# Patient Record
Sex: MALE | Race: WHITE | NOT HISPANIC OR LATINO | Employment: OTHER | URBAN - METROPOLITAN AREA
[De-identification: names, ages, dates, MRNs, and addresses within clinical notes are randomized per-mention and may not be internally consistent; named-entity substitution may affect disease eponyms.]

---

## 2017-01-24 ENCOUNTER — TELEPHONE (OUTPATIENT)
Dept: NEUROLOGY | Facility: CLINIC | Age: 72
End: 2017-01-24

## 2017-01-24 NOTE — TELEPHONE ENCOUNTER
I spoke to Ted---doing well, and no seizures     He is now going onto Medicare part D.  The cost of brand name Dilantin was discussed.  He does not want to switch to generic.  I believe he has had seizures on the generic version in the past.  It would be a good idea for him to remain on Dilantin brand name permanently.  He currently takes 330 mg per day.  We reviewed that the 100 mg capsules and the 300 mg capsules are both extended release.

## 2017-07-24 ENCOUNTER — TELEPHONE (OUTPATIENT)
Dept: NEUROLOGY | Facility: CLINIC | Age: 72
End: 2017-07-24

## 2017-07-25 NOTE — TELEPHONE ENCOUNTER
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  He remains seizure-free for many years on Dilantin.  He text at me his recent lab from June 27 of this year.  Dilantin level XVII.6.  Free level I.3.  He also noted mild chronic imbalance.  He thought this could be due to Dilantin and I agreed.  I said it was a problem with Dilantin toxicity but also can be a side effect of long-term Dilantin use.  There was no specific antidote for this problem other than to keep the dose in the nontoxic range.  Another possibility of course would be to switch medicines.  I recommended possibly trying Keppra with a slight reduction of Dilantin by 30 mg per day.  The Keppra would need a blood level as well.  No decisions made.    I will also notify him that imbalance can be due to thyroid or vitamin deficiencies and suggest B12 folate and TSH levels

## 2017-09-12 ENCOUNTER — TELEPHONE (OUTPATIENT)
Dept: NEUROLOGY | Facility: CLINIC | Age: 72
End: 2017-09-12

## 2017-09-12 NOTE — TELEPHONE ENCOUNTER
----- Message from Shelia Marcano sent at 9/12/2017  4:20 PM EDT -----  Contact: [phone]   want to speak to  on coming in for an appointment because he is having Gait issues, before he is put on any medication or switch he want to physically come in and speak to .

## 2017-09-13 NOTE — TELEPHONE ENCOUNTER
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      He will come September 22 at 1 PM.      Could you at him to the schedule please?

## 2017-09-22 ENCOUNTER — OFFICE VISIT (OUTPATIENT)
Dept: NEUROLOGY | Facility: CLINIC | Age: 72
End: 2017-09-22

## 2017-09-22 VITALS
SYSTOLIC BLOOD PRESSURE: 116 MMHG | DIASTOLIC BLOOD PRESSURE: 72 MMHG | OXYGEN SATURATION: 98 % | WEIGHT: 172.2 LBS | HEART RATE: 67 BPM | BODY MASS INDEX: 24.65 KG/M2 | HEIGHT: 70 IN

## 2017-09-22 DIAGNOSIS — G40.009 PARTIAL IDIOPATHIC EPILEPSY WITH SEIZURES OF LOCALIZED ONSET, NOT INTRACTABLE, WITHOUT STATUS EPILEPTICUS (HCC): Primary | ICD-10-CM

## 2017-09-22 DIAGNOSIS — R26.89 LOSS OF BALANCE: ICD-10-CM

## 2017-09-22 DIAGNOSIS — T50.905D ADVERSE DRUG EFFECT, SUBSEQUENT ENCOUNTER: ICD-10-CM

## 2017-09-22 PROBLEM — T50.905A ADVERSE DRUG EFFECT: Status: ACTIVE | Noted: 2017-09-22

## 2017-09-22 PROCEDURE — 99203 OFFICE O/P NEW LOW 30 MIN: CPT | Performed by: PSYCHIATRY & NEUROLOGY

## 2017-09-22 RX ORDER — RAMIPRIL 5 MG/1
5 CAPSULE ORAL 2 TIMES DAILY
COMMUNITY
End: 2018-01-17

## 2017-09-22 RX ORDER — HYDROCHLOROTHIAZIDE 25 MG/1
25 TABLET ORAL DAILY
COMMUNITY

## 2017-09-22 RX ORDER — PHENYTOIN SODIUM 100 MG/1
300 CAPSULE, EXTENDED RELEASE ORAL 2 TIMES DAILY
COMMUNITY

## 2017-09-22 RX ORDER — ASPIRIN/CALCIUM/MAG/ALUMINUM 325 MG
TABLET ORAL
COMMUNITY

## 2017-09-22 RX ORDER — ROSUVASTATIN CALCIUM 20 MG/1
TABLET, COATED ORAL
Refills: 0 | COMMUNITY
Start: 2017-09-18

## 2017-09-22 NOTE — PROGRESS NOTES
Subjective:     Patient ID: Ted Morales is a 72 y.o. male.    History of Present Illness  The following portions of the patient's history were reviewed and updated as appropriate: allergies, current medications, past family history, past medical history, past social history, past surgical history and problem list.  Epilepsy: This physician has no me for many years.  I signed at least 25 years ago for history of a well-controlled seizure disorder while taking Dilantin and doing well.  Over the years I have discussed his blood levels but have not needed to see him annually.  More than 13 years ago he had a seizure while in [place].  That time his Dilantin level had fallen to less than 10 and since that time he has been seizure free and the Dilantin dose has been about 330 mg daily.  Dilantin levels have generally been between 10 and 20.  On June 27 the Dilantin level was 17.6.  The free level was 1.3    He has had auras prior to his seizures.  These have been of a visual nature.  He has a sensation of looking at a mere and the image getting deeper and deeper into the mayor.  One time he had such an aura which did not become a seizure.  He is probably had less than 10 seizures in his whole life    He has noted symptoms of imbalance slowly over the last couple of years.  There've been no falls.  Sometimes while at communion he will noted a tendency to be off balance.  No other symptoms of Dilantin related problems such as neuropathy noted.  He has not had any recent brain scan studies.    Recent TSH was normal.  B12 and folate have not been checked.    He has a past history of bacterial pulmonary infection which has resolved.  Review of Systems   Constitutional: Negative for activity change, appetite change and fatigue.   HENT: Negative for ear pain, facial swelling and trouble swallowing.    Eyes: Negative for photophobia, pain and visual disturbance.   Respiratory: Negative for choking, chest tightness and  shortness of breath.    Cardiovascular: Negative for chest pain, palpitations and leg swelling.   Gastrointestinal: Negative for abdominal pain, constipation and nausea.   Endocrine: Negative for polydipsia, polyphagia and polyuria.   Genitourinary: Negative for difficulty urinating, frequency and urgency.   Musculoskeletal: Positive for gait problem. Negative for back pain and neck pain.   Skin: Negative for color change, rash and wound.   Allergic/Immunologic: Negative for environmental allergies, food allergies and immunocompromised state.   Neurological: Negative for dizziness, tremors, seizures, syncope, facial asymmetry, speech difficulty, weakness, light-headedness, numbness and headaches.   Hematological: Negative for adenopathy. Bruises/bleeds easily.   Psychiatric/Behavioral: Negative for agitation, behavioral problems, confusion, decreased concentration, dysphoric mood, hallucinations, self-injury, sleep disturbance and suicidal ideas. The patient is not nervous/anxious and is not hyperactive.         Objective:    Neurologic Exam     Mental Status   Oriented to person, place, and time.   Follows 3 step commands.   Attention: normal. Concentration: normal.   Speech: speech is normal   Level of consciousness: alert  Knowledge: good.   Able to name object. Able to read. Able to repeat. Able to write. Normal comprehension.     Cranial Nerves     CN II   Visual fields full to confrontation.     CN III, IV, VI   Pupils are equal, round, and reactive to light.    CN VII   Facial expression full, symmetric.     CN VIII   CN VIII normal.     CN XI   CN XI normal.     CN XII   CN XII normal.     Motor Exam   Muscle bulk: normal  Overall muscle tone: normal       All strength.  No atrophy     Sensory Exam   Light touch normal.   Vibration normal.   Proprioception normal.        Vibration slightly decreased at the toes but normal elsewhere throughout the foot     Gait, Coordination, and Reflexes     Gait  Gait:  normal    Coordination   Romberg: negative  Finger to nose coordination: normal  Heel to shin coordination: normal  Tandem walking coordination: normal    Tremor   Resting tremor: absent  Intention tremor: absent  Action tremor: absent    Reflexes   Right brachioradialis: 1+  Left brachioradialis: 1+  Right biceps: 1+  Left biceps: 1+  Right triceps: 1+  Left triceps: 1+  Right patellar: 1+  Left patellar: 1+  Right achilles: 1+  Left achilles: 1+  Right plantar: normal  Left plantar: normal      Physical Exam   Constitutional: He is oriented to person, place, and time. He appears well-developed and well-nourished.   Eyes: Pupils are equal, round, and reactive to light.   Neck: Normal range of motion. Neck supple.   No carotid or vertebral bruits   Neurological: He is oriented to person, place, and time. He has a normal Finger-Nose-Finger Test, a normal Heel to Shin Test, a normal Romberg Test and a normal Tandem Gait Test. Gait normal.   Reflex Scores:       Tricep reflexes are 1+ on the right side and 1+ on the left side.       Bicep reflexes are 1+ on the right side and 1+ on the left side.       Brachioradialis reflexes are 1+ on the right side and 1+ on the left side.       Patellar reflexes are 1+ on the right side and 1+ on the left side.       Achilles reflexes are 1+ on the right side and 1+ on the left side.  Psychiatric: He has a normal mood and affect. His speech is normal and behavior is normal. Judgment and thought content normal.   Nursing note and vitals reviewed.      Assessment/Plan:       Problems Addressed this Visit        Unprioritized    Partial idiopathic epilepsy with seizures of localized onset, not intractable, without status epilepticus - Primary    Relevant Medications    phenytoin (DILANTIN) 100 MG ER capsule    phenytoin (DILANTIN) 30 MG ER capsule    Adverse drug effect    Loss of balance    Relevant Orders    Ambulatory Referral to Physical Therapy Evaluate and treat           I  suspect he has partial seizures with rapid secondary generalization, well-controlled on Dilantin.  The imbalance likely is due to Dilantin cerebellar fax.  I did not see any definite ataxia today in spite of his symptoms.  His tandem walk was normal.    He had no evidence of peripheral neuropathy.    We discussed the following issues: Consider vestibular rehabilitation.  Consider brain MRI with contrast, B12 folate and MMA levels.  Consider a trial of BuSpar.  Consider adding Keppra and then tapering Dilantin.    At this time he is going to go ahead with vestibular rehabilitation.  He is going to have the Dilantin level checked at six-month intervals.  Free level should be checked with each blood test.

## 2017-10-04 ENCOUNTER — HOSPITAL ENCOUNTER (OUTPATIENT)
Dept: MRI IMAGING | Facility: HOSPITAL | Age: 72
Discharge: HOME | End: 2017-10-04
Attending: PSYCHIATRY & NEUROLOGY | Admitting: PSYCHIATRY & NEUROLOGY

## 2017-10-04 DIAGNOSIS — G40.009 PARTIAL IDIOPATHIC EPILEPSY WITH SEIZURES OF LOCALIZED ONSET, NOT INTRACTABLE, WITHOUT STATUS EPILEPTICUS (HCC): ICD-10-CM

## 2017-10-04 DIAGNOSIS — R26.89 LOSS OF BALANCE: ICD-10-CM

## 2017-10-04 PROCEDURE — 70551 MRI BRAIN STEM W/O DYE: CPT

## 2017-10-05 ENCOUNTER — APPOINTMENT (OUTPATIENT)
Dept: MRI IMAGING | Facility: HOSPITAL | Age: 72
End: 2017-10-05
Attending: PSYCHIATRY & NEUROLOGY

## 2017-10-25 ENCOUNTER — TREATMENT (OUTPATIENT)
Dept: PHYSICAL THERAPY | Facility: CLINIC | Age: 72
End: 2017-10-25

## 2017-10-25 DIAGNOSIS — R26.89 LOSS OF BALANCE: Primary | ICD-10-CM

## 2017-10-25 DIAGNOSIS — R26.2 DIFFICULTY WALKING: ICD-10-CM

## 2017-10-25 PROCEDURE — 97112 NEUROMUSCULAR REEDUCATION: CPT | Performed by: PHYSICAL THERAPIST

## 2017-10-25 PROCEDURE — G8978 MOBILITY CURRENT STATUS: HCPCS | Performed by: PHYSICAL THERAPIST

## 2017-10-25 PROCEDURE — G8979 MOBILITY GOAL STATUS: HCPCS | Performed by: PHYSICAL THERAPIST

## 2017-10-25 PROCEDURE — 97161 PT EVAL LOW COMPLEX 20 MIN: CPT | Performed by: PHYSICAL THERAPIST

## 2017-10-25 NOTE — PROGRESS NOTES
Physical Therapy Initial Evaluation-  Vestibular Therapy      Patient Name: Ted Morales       Patient MRN: [id number]  : 1945  Physician:Param Reese MD  Date: 10/25/2017    Encounter Diagnoses   Name Primary?   • Loss of balance Yes   • Difficulty walking        Objective Testing:  Positional Testing  Positional Testing: With infrared goggles  Vertebrobasilar Artery Screen - Right: Negative  Vertebrobasilar Artery Screen - Left: Negative  Ember-Hallpike Right: No nystagmus  [place]-Hallpike Left: No nystagmus  Horizontal Roll Test Right: No nystagmus  Horizontal Roll Test Left: No nystagmus  Computerized Dynamic Posturography  Composite Equilibrium Score: 80, above normal for age  Sensory Analysis WDL: Within Defined Limits  Center of Gravity Alignment: anterior  Strategy Analysis: Ankle Dominant  Occulomotor Exam Fixation Present  Spontaneous Nystagmus: Absent  Smooth Pursuit: Saccadic (vertical), normal horizontal  Saccades: Intact, Bilateral:  Convergence: Normal  VOR with Occulomotor Exam Fixation Absent   Spontaneous Nystagmus: Absent    THERAPY ASSESSMENT: Patient is a 72 y.o. male. Patient presents to physical therapy due to complaints of loss of balance and difficulty walking. He tested normal on use of all sensory systems of balance.  MRI of brain was unremarkable. Oculomotor testing and positional vertigo testing was negative. Patient wishes to participate in PT to improve walking ability.  Patient is a good candidate for physical therapy to address the following:  Functional Limitations: Walking   Length of Therapy: 2 weeks   PT Frequency: PT 2x week   PT Interventions: Home Exercise Program, Balance Training   Patient Agrees with Plan of Care: Yes    REHAB POTENTIAL: good            SHORT TERM GOALS: To be met in 2 weeks:  1. Patient is independent with HEP of static/dynamic and ADL incorporated challenges to VSR/VOR.  2. Patient will report at least 30% improvement in overall condition.        LONG TERM GOALS:To be met in 4 weeks:  1. Patient will report no loss of balance with ADLs to demonstrate improved functional balance.    NMR 50278 10 minutes Neurocom testing    Timed Code Treatment: 10   Minutes     Total Treatment Time: 60      Minutes      PT SIGNATURE: Rossana Stafford PT   DATE TREATMENT INITIATED: 10/26/2017    Medicare Initial Certification  Certification Period: 1/24/2018  I certify that the therapy services are furnished while this patient is under my care.  The services outlined above are required by this patient, and will be reviewed every 90 days.     PHYSICIAN: Param Reese MD      DATE:     Please sign and return via fax to [phone].. Thank you, [facility].

## 2017-11-14 ENCOUNTER — TREATMENT (OUTPATIENT)
Dept: PHYSICAL THERAPY | Facility: CLINIC | Age: 72
End: 2017-11-14

## 2017-11-14 DIAGNOSIS — R26.89 LOSS OF BALANCE: Primary | ICD-10-CM

## 2017-11-14 DIAGNOSIS — R26.2 DIFFICULTY WALKING: ICD-10-CM

## 2017-11-14 PROCEDURE — 97112 NEUROMUSCULAR REEDUCATION: CPT | Performed by: PHYSICAL THERAPIST

## 2017-11-14 NOTE — PROGRESS NOTES
Physical Therapy Daily Progress Note    Visit #: 2  Ted Morales reports: I fell walking up the stairs while I was carrying boxes.   Pain Scale (0-10):     Subjective       Objective   See Exercise, Manual, and Modality Logs for complete treatment.     PROCEDURES AND MODALITIES:  Paraffin:   pre-rx  Moist Heat:    Ice:   post-rx  E-Stim:    Ultrasound:    Ionto:   Traction:    Dry Needling:         NMR 70227 45 minutes     Timed Code Treatment: 45 Minutes  Total Treatment Time: 45 Minutes    Assessment/Plan  Patient has reduced SLS times L > R. Patient has reduced balance on compliant surfaces dominguez with head movement and vision denied.     Progress per Plan of Care      Rossana Stafford, PT, DPT  Physical Therapist  [place] License # [id number]

## 2017-12-15 ENCOUNTER — LAB (OUTPATIENT)
Dept: LAB | Facility: HOSPITAL | Age: 72
End: 2017-12-15

## 2017-12-15 ENCOUNTER — CONSULT (OUTPATIENT)
Dept: ONCOLOGY | Facility: CLINIC | Age: 72
End: 2017-12-15

## 2017-12-15 VITALS
BODY MASS INDEX: 25.68 KG/M2 | DIASTOLIC BLOOD PRESSURE: 76 MMHG | OXYGEN SATURATION: 100 % | TEMPERATURE: 97.7 F | WEIGHT: 173.4 LBS | HEIGHT: 69 IN | SYSTOLIC BLOOD PRESSURE: 126 MMHG | RESPIRATION RATE: 12 BRPM | HEART RATE: 51 BPM

## 2017-12-15 DIAGNOSIS — D47.2 MGUS (MONOCLONAL GAMMOPATHY OF UNKNOWN SIGNIFICANCE): Primary | ICD-10-CM

## 2017-12-15 LAB
ALBUMIN SERPL-MCNC: 4.4 G/DL (ref 3.5–5.2)
ALBUMIN/GLOB SERPL: 1.4 G/DL (ref 1.1–2.4)
ALP SERPL-CCNC: 105 U/L (ref 38–116)
ALT SERPL W P-5'-P-CCNC: 21 U/L (ref 0–41)
ANION GAP SERPL CALCULATED.3IONS-SCNC: 11.1 MMOL/L
AST SERPL-CCNC: 27 U/L (ref 0–40)
BASOPHILS # BLD AUTO: 0.04 10*3/MM3 (ref 0–0.1)
BASOPHILS NFR BLD AUTO: 0.6 % (ref 0–1.1)
BILIRUB SERPL-MCNC: <0.2 MG/DL (ref 0.1–1.2)
BUN BLD-MCNC: 26 MG/DL (ref 6–20)
BUN/CREAT SERPL: 28.6 (ref 7.3–30)
CALCIUM SPEC-SCNC: 9 MG/DL (ref 8.5–10.2)
CHLORIDE SERPL-SCNC: 96 MMOL/L (ref 98–107)
CO2 SERPL-SCNC: 29.9 MMOL/L (ref 22–29)
CREAT BLD-MCNC: 0.91 MG/DL (ref 0.7–1.3)
DEPRECATED RDW RBC AUTO: 45.5 FL (ref 37–49)
EOSINOPHIL # BLD AUTO: 0.37 10*3/MM3 (ref 0–0.36)
EOSINOPHIL NFR BLD AUTO: 5.7 % (ref 1–5)
ERYTHROCYTE [DISTWIDTH] IN BLOOD BY AUTOMATED COUNT: 13.4 % (ref 11.7–14.5)
ERYTHROCYTE [SEDIMENTATION RATE] IN BLOOD: 16 MM/HR (ref 0–20)
GFR SERPL CREATININE-BSD FRML MDRD: 82 ML/MIN/1.73
GLOBULIN UR ELPH-MCNC: 3.2 GM/DL (ref 1.8–3.5)
GLUCOSE BLD-MCNC: 96 MG/DL (ref 74–124)
HCT VFR BLD AUTO: 34.5 % (ref 40–49)
HGB BLD-MCNC: 12.2 G/DL (ref 13.5–16.5)
IMM GRANULOCYTES # BLD: 0.06 10*3/MM3 (ref 0–0.03)
IMM GRANULOCYTES NFR BLD: 0.9 % (ref 0–0.5)
LYMPHOCYTES # BLD AUTO: 1.93 10*3/MM3 (ref 1–3.5)
LYMPHOCYTES NFR BLD AUTO: 29.7 % (ref 20–49)
MCH RBC QN AUTO: 32.4 PG (ref 27–33)
MCHC RBC AUTO-ENTMCNC: 35.4 G/DL (ref 32–35)
MCV RBC AUTO: 91.5 FL (ref 83–97)
MONOCYTES # BLD AUTO: 0.93 10*3/MM3 (ref 0.25–0.8)
MONOCYTES NFR BLD AUTO: 14.3 % (ref 4–12)
NEUTROPHILS # BLD AUTO: 3.17 10*3/MM3 (ref 1.5–7)
NEUTROPHILS NFR BLD AUTO: 48.8 % (ref 39–75)
NRBC BLD MANUAL-RTO: 0 /100 WBC (ref 0–0)
PLATELET # BLD AUTO: 201 10*3/MM3 (ref 150–375)
PMV BLD AUTO: 9.2 FL (ref 8.9–12.1)
POTASSIUM BLD-SCNC: 4 MMOL/L (ref 3.5–4.7)
PROT SERPL-MCNC: 7.6 G/DL (ref 6.3–8)
RBC # BLD AUTO: 3.77 10*6/MM3 (ref 4.3–5.5)
SODIUM BLD-SCNC: 137 MMOL/L (ref 134–145)
WBC NRBC COR # BLD: 6.5 10*3/MM3 (ref 4–10)

## 2017-12-15 PROCEDURE — 36416 COLLJ CAPILLARY BLOOD SPEC: CPT | Performed by: INTERNAL MEDICINE

## 2017-12-15 PROCEDURE — 85652 RBC SED RATE AUTOMATED: CPT | Performed by: INTERNAL MEDICINE

## 2017-12-15 PROCEDURE — 80053 COMPREHEN METABOLIC PANEL: CPT | Performed by: INTERNAL MEDICINE

## 2017-12-15 PROCEDURE — 36415 COLL VENOUS BLD VENIPUNCTURE: CPT | Performed by: INTERNAL MEDICINE

## 2017-12-15 PROCEDURE — 85025 COMPLETE CBC W/AUTO DIFF WBC: CPT | Performed by: INTERNAL MEDICINE

## 2017-12-15 PROCEDURE — 99204 OFFICE O/P NEW MOD 45 MIN: CPT | Performed by: INTERNAL MEDICINE

## 2017-12-15 RX ORDER — AMLODIPINE BESYLATE 5 MG/1
TABLET ORAL
Refills: 0 | COMMUNITY
Start: 2017-10-06 | End: 2017-12-15 | Stop reason: DRUGHIGH

## 2017-12-15 RX ORDER — AMLODIPINE BESYLATE 10 MG/1
TABLET ORAL
Refills: 0 | COMMUNITY
Start: 2017-10-31 | End: 2018-03-27

## 2017-12-15 RX ORDER — BISOPROLOL FUMARATE 5 MG/1
5 TABLET, FILM COATED ORAL DAILY
COMMUNITY
End: 2019-12-05

## 2017-12-15 NOTE — PROGRESS NOTES
Subjective feels well, concerned about a malignant process    REASON FOR CONSULTATION:  IgM MGUS    Provide an opinion on any further workup or treatment                             REQUESTING PHYSICIAN:  Jensen Vieira M.D.    RECORDS OBTAINED:  Records of the patients history including those obtained from the referring provider were reviewed and summarized in detail.    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 There is also  a question is because patients is history of angioedema approximately 6 years ago possibly from generic Levaquin and an additional episode in October of this year requiring ICU placement and steroids.  In conversation when seen December 15 he also describes in 1993 through 1995 an episode of ELIO treated by infectious disease and ultimately leading to a right lower lobe lobectomy to control the process.  He had to take additional antibiotic therapies over 2 years following his procedure.  Recent additional laboratory studies obtained included a total protein of 8.3, albumin 5.0, quantitative immunoglobulins with an elevated IgM 753, (60-2 63), IgA of 54 (60-3 78), IgG of 755.  Additional testing including C4 complement of 15, C1 esterase function elevating felt normal, quantitative of 38.  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  He presents for assessment of this in part as a result of his sister having Waldenström's and eventually development of further lymphoma.  Past Medical History:   Diagnosis Date   • Seizures         Past Surgical History:   Procedure Laterality Date   • LUNG LOBECTOMY      Right Lower        Current Outpatient Prescriptions on File Prior to Visit  "  Medication Sig Dispense Refill   • Aspirin Buf,[id number]-[id number], (ASCRIPTIN) 325 MG tablet Take  by mouth.     • Cholecalciferol (DIALYVITE VITAMIN D 5000 PO) Take  by mouth.     • hydrochlorothiazide (HYDRODIURIL) 25 MG tablet Take 25 mg by mouth Daily.     • phenytoin (DILANTIN) 100 MG ER capsule Take 300 mg by mouth 2 (Two) Times a Day.     • phenytoin (DILANTIN) 30 MG ER capsule Take  by mouth.     • ramipril (ALTACE) 5 MG capsule Take 5 mg by mouth 2 (Two) Times a Day.     • rosuvastatin (CRESTOR) 20 MG tablet take 1 tablet by mouth once daily  0     No current facility-administered medications on file prior to visit.         ALLERGIES:    Allergies   Allergen Reactions   • Cefdinir    • Levaquin [Levofloxacin] Other (See Comments)     angioadema loryngeldema   • Other      All ace inhibitors  AND  ARE   • Rifampin Other (See Comments)     Drug induced Hep        Social History     Social History   • Marital status:      Spouse name: N/A   • Number of children: N/A   • Years of education: N/A     Social History Main Topics   • Smoking status: Never Smoker   • Smokeless tobacco: Never Used   • Alcohol use 1.2 oz/week     2 Glasses of wine per week      Comment: 3-4 times week    • Drug use: No   • Sexual activity: Defer     Other Topics Concern   • None     Social History Narrative        Family History   Problem Relation Age of Onset   • Breast cancer Mother    • Heart disease Father         Review of Systems   See history of present illness per angioedema this past summer possibly as result of ace inhibitor use   Objective     Vitals:    12/15/17 1227   BP: 126/76   Pulse: 51   Resp: 12   Temp: 97.7 °F (36.5 °C)   TempSrc: Oral   SpO2: 100%   Weight: 78.7 kg (173 lb 6.4 oz)   Height: 175.7 cm (69.17\")  Comment: new height with out shoes   PainSc: 0-No pain     Current Status 12/15/2017   ECOG score 0       Physical Exam    Constitutional: He is oriented to person, place, and time. He appears " well-developed and well-nourished.   HENT:   Head: Normocephalic.   Eyes: Pupils are equal, round, and reactive to light.   Neck: Normal range of motion. No JVD present. Carotid bruit is not present. No thyromegaly present.   Cardiovascular: Normal rate, regular rhythm, S1 normal, S2 normal, normal heart sounds and intact distal pulses.  Exam reveals no gallop and no friction rub.    No murmur heard.  Pulmonary/Chest: Effort normal and breath sounds normal.   Abdominal: Soft. Bowel sounds are normal.   Musculoskeletal: He exhibits no edema.   Neurological: He is alert and oriented to person, place, and time.   Skin: Skin is warm, dry and intact. No erythema.   Psychiatric: He has a normal mood and affect  RECENT LABS:  Hematology WBC   Date Value Ref Range Status   12/15/2017 6.50 4.00 - 10.00 10*3/mm3 Final     RBC   Date Value Ref Range Status   12/15/2017 3.77 (L) 4.30 - 5.50 10*6/mm3 Final     Hemoglobin   Date Value Ref Range Status   12/15/2017 12.2 (L) 13.5 - 16.5 g/dL Final     Hematocrit   Date Value Ref Range Status   12/15/2017 34.5 (L) 40.0 - 49.0 % Final     Platelets   Date Value Ref Range Status   12/15/2017 201 150 - 375 10*3/mm3 Final          Assessment/Plan           72-year-old male who, again, has worked as a radiologist for approximately 50 years who indicates he is taking care for exposures but is also treated for hypertension, hyperlipidemia, previous angioedema as described in the long-term seizure syndrome treated with Dilantin for many decades.  Recent laboratory studies done through his primary care physician has revealed an evident elevated IgM level which is reported as monoclonal but does not appear to have been quantitated per the actual M spike.  We have discussed his past medical history in detail today and find his physical exam does not reveal evidence of lymphadenopathy or hepatosplenomegaly and that per additional laboratory studies he is not having significant anemia,  hypercalcemia or renal dysfunction.  Furthermore he does not have neurologic symptoms though does have a history of seizures described above and is been on long-term Dilantin which, may itself, produce abnormalities inimmunoglobulin levels.  We have discussed IgM MGUS diagnostic criteria as consistent with an IgM M protein less than 3 g/dL, no evidence again of hypercalcemia, renal insufficiency, anemia or bone lesions and no constitutional symptoms as well as less than 10% clonal plasma cells.  At this point is agreed that he undergo assessment per CT scan of chest abdomen pelvis to determine any additional lymphadenopathy, recheck his paraprotein levels with both protein and immunoelectrophoresis, quantitative immunoglobulins, serum viscosity and free light chain analyses..  We'll also obtain MARIO and sedimentation rate as baseline analysis.  The patient plans a brief travel for the holidays and I have assured him that he does not need to stay in town as we proceed with his assessments.  I will contact him after his CT scans are completed and hope to see him back in 3-4 weeks to determine if we proceed with a bone marrow aspirate and biopsy.  He is agreeable with this plan and follow-up.

## 2017-12-17 ENCOUNTER — HOSPITAL ENCOUNTER (OUTPATIENT)
Dept: CT IMAGING | Facility: HOSPITAL | Age: 72
Discharge: HOME | End: 2017-12-17
Attending: INTERNAL MEDICINE | Admitting: INTERNAL MEDICINE

## 2017-12-17 DIAGNOSIS — D47.2 MGUS (MONOCLONAL GAMMOPATHY OF UNKNOWN SIGNIFICANCE): ICD-10-CM

## 2017-12-17 LAB — CREAT BLDA-MCNC: 1 MG/DL (ref 0.6–1.3)

## 2017-12-17 PROCEDURE — 71260 CT THORAX DX C+: CPT

## 2017-12-17 PROCEDURE — 0 IOPAMIDOL 61 % SOLUTION: Performed by: INTERNAL MEDICINE

## 2017-12-17 PROCEDURE — 82565 ASSAY OF CREATININE: CPT

## 2017-12-17 PROCEDURE — 74177 CT ABD & PELVIS W/CONTRAST: CPT

## 2017-12-17 RX ADMIN — IOPAMIDOL 85 ML: 612 INJECTION, SOLUTION INTRAVENOUS at 08:05

## 2017-12-18 LAB
ALBUMIN SERPL-MCNC: 3.8 G/DL (ref 2.9–4.4)
ALBUMIN/GLOB SERPL: 1.2 {RATIO} (ref 0.7–1.7)
ALPHA1 GLOB FLD ELPH-MCNC: 0.3 G/DL (ref 0–0.4)
ALPHA2 GLOB SERPL ELPH-MCNC: 0.8 G/DL (ref 0.4–1)
ANA SER QL: NEGATIVE
B-GLOBULIN SERPL ELPH-MCNC: 0.9 G/DL (ref 0.7–1.3)
GAMMA GLOB SERPL ELPH-MCNC: 1.4 G/DL (ref 0.4–1.8)
GLOBULIN SER CALC-MCNC: 3.3 G/DL (ref 2.2–3.9)
IGA SERPL-MCNC: 73 MG/DL (ref 61–437)
IGG SERPL-MCNC: 774 MG/DL (ref 700–1600)
IGM SERPL-MCNC: 705 MG/DL (ref 15–143)
INTERPRETATION SERPL IEP-IMP: ABNORMAL
KAPPA LC SERPL-MCNC: 24 MG/L (ref 3.3–19.4)
KAPPA LC/LAMBDA SER: 0.47 {RATIO} (ref 0.26–1.65)
LAMBDA LC FREE SERPL-MCNC: 51.1 MG/L (ref 5.7–26.3)
Lab: ABNORMAL
M-SPIKE: ABNORMAL G/DL
PROT SERPL-MCNC: 7.1 G/DL (ref 6–8.5)
VISC SER: 1.7 REL.SALINE (ref 1.6–1.9)

## 2017-12-19 ENCOUNTER — DOCUMENTATION (OUTPATIENT)
Dept: ONCOLOGY | Facility: CLINIC | Age: 72
End: 2017-12-19

## 2017-12-19 NOTE — PROGRESS NOTES
Discussed findings with patient .  He has an IgM MGUS of uncertain significance that certainly bears observation this point.  I would favor observation rather than automatically marrow exam under these circumstances unless he shows progression.  We'll discuss this when he is seen in January.

## 2018-01-17 ENCOUNTER — LAB (OUTPATIENT)
Dept: LAB | Facility: HOSPITAL | Age: 73
End: 2018-01-17

## 2018-01-17 ENCOUNTER — OFFICE VISIT (OUTPATIENT)
Dept: ONCOLOGY | Facility: CLINIC | Age: 73
End: 2018-01-17

## 2018-01-17 VITALS
WEIGHT: 178.6 LBS | BODY MASS INDEX: 26.45 KG/M2 | HEIGHT: 69 IN | TEMPERATURE: 97 F | SYSTOLIC BLOOD PRESSURE: 122 MMHG | DIASTOLIC BLOOD PRESSURE: 70 MMHG | RESPIRATION RATE: 16 BRPM | HEART RATE: 72 BPM

## 2018-01-17 DIAGNOSIS — G40.009 PARTIAL IDIOPATHIC EPILEPSY WITH SEIZURES OF LOCALIZED ONSET, NOT INTRACTABLE, WITHOUT STATUS EPILEPTICUS (HCC): Primary | ICD-10-CM

## 2018-01-17 DIAGNOSIS — D47.2 MGUS (MONOCLONAL GAMMOPATHY OF UNKNOWN SIGNIFICANCE): Primary | ICD-10-CM

## 2018-01-17 LAB
BASOPHILS # BLD AUTO: 0.08 10*3/MM3 (ref 0–0.1)
BASOPHILS NFR BLD AUTO: 1.1 % (ref 0–1.1)
DEPRECATED RDW RBC AUTO: 45.9 FL (ref 37–49)
EOSINOPHIL # BLD AUTO: 0.42 10*3/MM3 (ref 0–0.36)
EOSINOPHIL NFR BLD AUTO: 5.6 % (ref 1–5)
ERYTHROCYTE [DISTWIDTH] IN BLOOD BY AUTOMATED COUNT: 13.6 % (ref 11.7–14.5)
HCT VFR BLD AUTO: 35.7 % (ref 40–49)
HGB BLD-MCNC: 12.5 G/DL (ref 13.5–16.5)
IMM GRANULOCYTES # BLD: 0.08 10*3/MM3 (ref 0–0.03)
IMM GRANULOCYTES NFR BLD: 1.1 % (ref 0–0.5)
LYMPHOCYTES # BLD AUTO: 1.37 10*3/MM3 (ref 1–3.5)
LYMPHOCYTES NFR BLD AUTO: 18.2 % (ref 20–49)
MCH RBC QN AUTO: 31.8 PG (ref 27–33)
MCHC RBC AUTO-ENTMCNC: 35 G/DL (ref 32–35)
MCV RBC AUTO: 90.8 FL (ref 83–97)
MONOCYTES # BLD AUTO: 0.55 10*3/MM3 (ref 0.25–0.8)
MONOCYTES NFR BLD AUTO: 7.3 % (ref 4–12)
NEUTROPHILS # BLD AUTO: 5.03 10*3/MM3 (ref 1.5–7)
NEUTROPHILS NFR BLD AUTO: 66.7 % (ref 39–75)
NRBC BLD MANUAL-RTO: 0 /100 WBC (ref 0–0)
PLATELET # BLD AUTO: 210 10*3/MM3 (ref 150–375)
PMV BLD AUTO: 8.8 FL (ref 8.9–12.1)
RBC # BLD AUTO: 3.93 10*6/MM3 (ref 4.3–5.5)
WBC NRBC COR # BLD: 7.53 10*3/MM3 (ref 4–10)

## 2018-01-17 PROCEDURE — 85025 COMPLETE CBC W/AUTO DIFF WBC: CPT | Performed by: INTERNAL MEDICINE

## 2018-01-17 PROCEDURE — 36416 COLLJ CAPILLARY BLOOD SPEC: CPT | Performed by: INTERNAL MEDICINE

## 2018-01-17 PROCEDURE — 99214 OFFICE O/P EST MOD 30 MIN: CPT | Performed by: INTERNAL MEDICINE

## 2018-01-17 NOTE — PROGRESS NOTES
Subjective feels well, developed pneumonia while in [place], status post azithromycin therapy ×2 with resolution of symptoms    REASON FOR CONSULTATION:  IgM MGUS    Provide an opinion on any further workup or treatment                             REQUESTING PHYSICIAN:  Jensen Vieira M.D.    RECORDS OBTAINED:  Records of the patients history including those obtained from the referring provider were reviewed and summarized in detail.    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 There is also  a question is because patients is history of angioedema approximately 6 years ago possibly from generic Levaquin and an additional episode in October of this year requiring ICU placement and steroids.  In conversation when seen December 15 he also describes in 1993 through 1995 an episode of ELIO treated by infectious disease and ultimately leading to a right lower lobe lobectomy to control the process.  He had to take additional antibiotic therapies over 2 years following his procedure.  Recent additional laboratory studies obtained included a total protein of 8.3, albumin 5.0, quantitative immunoglobulins with an elevated IgM 753, (60-2 63), IgA of 54 (60-3 78), IgG of 755.  Additional testing including C4 complement of 15, C1 esterase function elevating felt normal, quantitative of 38.  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  He presents for assessment of this in part as a result of his sister having Waldenström's and eventually development of further lymphoma.  He underwent a series of studies including CT of chest and abdomen showing his previous lobectomy, minimal lymphadenopathy in the abdomen thought  to be reactive, no splenomegaly  , Laboratory studies demonstrating a drop in his IgM to 705 with normal IgA and IgG, sedimentation rate of 16 , negative MARIO, serum viscosity 1.7 and essentially normal serum chemistries.    As the patient seen back January 17 he is returned from [place] having developed a pneumonia there without radiologic information but treated with Zithromax ×2 with resolution of symptoms.  He feels quite well when seen back January 17 without symptoms.  We have discussed his findings as well as recently close follow-up at this point and if IgM accelerates back up then we proceed with a marrow.  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  We have discussed recent information of the often exceeding long periods of time before this progresses if at all.  We have agreed that before marrow was done one would want to demonstrate progression of the gammopathy or gammopathies and hematologic or physical exam alterations.           Past Medical History:   Diagnosis Date   • Heart disease    • ELIO (mycobacterium avium-intracellulare)    • Seizures 1970        Past Surgical History:   Procedure Laterality Date   • LUNG LOBECTOMY  1993    Right Lower   • TONSILLECTOMY AND ADENOIDECTOMY  1954        Current Outpatient Prescriptions on File Prior to Visit   Medication Sig Dispense Refill   • amLODIPine (NORVASC) 10 MG tablet take 1 tablet by mouth once daily  0   • Aspirin Buf,[id number], (ASCRIPTIN) 325 MG tablet Take  by mouth.     • bisoprolol (ZEBeta) 5 MG tablet Take 5 mg by mouth Daily.     • Cholecalciferol (DIALYVITE VITAMIN D 5000 PO) Take  by mouth.     • hydrochlorothiazide (HYDRODIURIL) 25 MG tablet Take 25 mg by mouth Daily.     • phenytoin (DILANTIN) 100 MG ER capsule Take 300 mg by mouth 2 (Two) Times a Day.     • phenytoin (DILANTIN) 30 MG ER capsule Take  by mouth.     • ramipril (ALTACE) 5 MG  capsule Take 5 mg by mouth 2 (Two) Times a Day.     • rosuvastatin (CRESTOR) 20 MG tablet take 1 tablet by mouth once daily  0     No current facility-administered medications on file prior to visit.         ALLERGIES:    Allergies   Allergen Reactions   • Cefdinir    • Levaquin [Levofloxacin] Other (See Comments)     angioadema loryngeldema   • Other      All ace inhibitors  AND  ARE   • Rifampin Other (See Comments)     Drug induced Hep        Social History     Social History   • Marital status:      Spouse name: Yenny   • Number of children: N/A   • Years of education: Medical school     Occupational History   • Interventional radiologist Retired     Social History Main Topics   • Smoking status: Never Smoker   • Smokeless tobacco: Never Used   • Alcohol use 1.2 oz/week     2 Glasses of wine per week      Comment: 3-4 times week    • Drug use: No   • Sexual activity: Defer     Other Topics Concern   • Not on file     Social History Narrative        Family History   Problem Relation Age of Onset   • Breast cancer Mother    • Heart disease Father    • Pulmonary embolism Father    • Breast cancer Sister    • Diabetes Sister    • Anemia Sister    • Lymphoma Brother         Review of Systems   See history of present illness per angioedema this past summer possibly as result of ace inhibitor use   Objective     There were no vitals filed for this visit.  Current Status 12/15/2017   ECOG score 0       Physical Exam    Constitutional: He is oriented to person, place, and time. He appears well-developed and well-nourished.   HENT:   Head: Normocephalic.   Eyes: Pupils are equal, round, and reactive to light.   Neck: Normal range of motion. No JVD present. Carotid bruit is not present. No thyromegaly present.   Cardiovascular: Normal rate, regular rhythm, S1 normal, S2 normal, normal heart sounds and intact distal pulses.  Exam reveals no gallop and no friction rub.    No murmur heard.  Pulmonary/Chest: Effort  normal and breath sounds normal.   Abdominal: Soft. Bowel sounds are normal.   Musculoskeletal: He exhibits no edema.   Neurological: He is alert and oriented to person, place, and time.   Skin: Skin is warm, dry and intact. No erythema.   Psychiatric: He has a normal mood and affect  RECENT LABS:  Hematology WBC   Date Value Ref Range Status   12/15/2017 6.50 4.00 - 10.00 10*3/mm3 Final     RBC   Date Value Ref Range Status   12/15/2017 3.77 (L) 4.30 - 5.50 10*6/mm3 Final     Hemoglobin   Date Value Ref Range Status   12/15/2017 12.2 (L) 13.5 - 16.5 g/dL Final     Hematocrit   Date Value Ref Range Status   12/15/2017 34.5 (L) 40.0 - 49.0 % Final     Platelets   Date Value Ref Range Status   12/15/2017 201 150 - 375 10*3/mm3 Final      CT CHEST, ABDOMEN AND PELVIS WITH CONTRAST  December 17, 2017  IMPRESSION:  1. Status post right lower lobectomy since prior imaging in 2014. There  are scattered cluster of nodules and reticulonodular infiltrate  demonstrated bilaterally, likely a result of chronic or sequela of  low-grade infection. Mixed density nodules seen within the left lower  lobe and anterior right upper lobe are likely a sequela of infection as  well however, continued follow-up is necessary.  2. Within the abdomen, there are mildly prominent and low-density lymph  nodes seen within the mesentery retroperitoneum, periportal location as  detailed above. Correlation with any prior imaging of the abdomen would  be most helpful. The differential includes lymphadenopathy from sequela  from prior mycobacterium infection, reactive lymphadenopathy or other.  No splenomegaly. In the absence of remote imaging, consider follow-up  with CT imaging.          Assessment/Plan           72-year-old male who, again, has worked as a radiologist for approximately 50 years who indicates he is taking care for exposures but is also treated for hypertension, hyperlipidemia, previous angioedema as described in the long-term seizure  syndrome treated with Dilantin for many decades.  Recent laboratory studies done through his primary care physician has revealed an evident elevated IgM level which is reported as monoclonal but does not appear to have been quantitated per the actual M spike.  We have discussed his past medical history in detail today and find his physical exam does not reveal evidence of lymphadenopathy or hepatosplenomegaly and that per additional laboratory studies he is not having significant anemia, hypercalcemia or renal dysfunction.  Furthermore he does not have neurologic symptoms though does have a history of seizures described above and is been on long-term Dilantin which, may itself, produce abnormalities inimmunoglobulin levels.  We have discussed IgM MGUS diagnostic criteria as consistent with an IgM M protein less than 3 g/dL, no evidence again of hypercalcemia, renal insufficiency, anemia or bone lesions and no constitutional symptoms as well as less than 10% clonal plasma cells.  As result of the above we had him proceed - assessment per CT scan of chest abdomen pelvis to determine any additional lymphadenopathy, recheck his paraprotein levels with both protein and immunoelectrophoresis, quantitative immunoglobulins, serum viscosity and free light chain analyses as well as  MARIO and sedimentation rate as baseline analysis.     Studies, reviewed above and with the patient and his wife when seen January 17 are not particularly directive of significant abnormalities at this point other than IgM monoclonal gammopathies.  It is felt this is best observed at this point unless he demonstrates hematologic worsening or physical exam findings that would suggest progression.  At present we'll continue observation but recheck him in approximately   3 months with follow-up paraprotein analysis, serum chemistries and serum viscosity levels.  He'll be seen a week later after his levels drawn.  He and his wife are agreeable to this  plan and follow-up.

## 2018-03-21 ENCOUNTER — APPOINTMENT (OUTPATIENT)
Dept: LAB | Facility: HOSPITAL | Age: 73
End: 2018-03-21

## 2018-03-21 ENCOUNTER — LAB (OUTPATIENT)
Dept: LAB | Facility: HOSPITAL | Age: 73
End: 2018-03-21

## 2018-03-21 DIAGNOSIS — D47.2 MGUS (MONOCLONAL GAMMOPATHY OF UNKNOWN SIGNIFICANCE): Primary | ICD-10-CM

## 2018-03-21 DIAGNOSIS — R79.9 ABNORMAL FINDING OF BLOOD CHEMISTRY: ICD-10-CM

## 2018-03-21 LAB
ALBUMIN SERPL-MCNC: 4.6 G/DL (ref 3.5–5.2)
ALBUMIN/GLOB SERPL: 1.5 G/DL (ref 1.1–2.4)
ALP SERPL-CCNC: 80 U/L (ref 38–116)
ALT SERPL W P-5'-P-CCNC: 20 U/L (ref 0–41)
ANION GAP SERPL CALCULATED.3IONS-SCNC: 12.3 MMOL/L
AST SERPL-CCNC: 25 U/L (ref 0–40)
BASOPHILS # BLD AUTO: 0.06 10*3/MM3 (ref 0–0.1)
BASOPHILS NFR BLD AUTO: 1.1 % (ref 0–1.1)
BILIRUB SERPL-MCNC: 0.4 MG/DL (ref 0.1–1.2)
BUN BLD-MCNC: 23 MG/DL (ref 6–20)
BUN/CREAT SERPL: 23.7 (ref 7.3–30)
CALCIUM SPEC-SCNC: 9.2 MG/DL (ref 8.5–10.2)
CHLORIDE SERPL-SCNC: 93 MMOL/L (ref 98–107)
CHOLEST SERPL-MCNC: 223 MG/DL (ref 0–200)
CO2 SERPL-SCNC: 29.7 MMOL/L (ref 22–29)
CREAT BLD-MCNC: 0.97 MG/DL (ref 0.7–1.3)
DEPRECATED RDW RBC AUTO: 48.9 FL (ref 37–49)
EOSINOPHIL # BLD AUTO: 0.28 10*3/MM3 (ref 0–0.36)
EOSINOPHIL NFR BLD AUTO: 5.2 % (ref 1–5)
ERYTHROCYTE [DISTWIDTH] IN BLOOD BY AUTOMATED COUNT: 14.2 % (ref 11.7–14.5)
GFR SERPL CREATININE-BSD FRML MDRD: 76 ML/MIN/1.73
GLOBULIN UR ELPH-MCNC: 3 GM/DL (ref 1.8–3.5)
GLUCOSE BLD-MCNC: 95 MG/DL (ref 74–124)
HCT VFR BLD AUTO: 36.3 % (ref 40–49)
HDLC SERPL-MCNC: 107 MG/DL (ref 40–60)
HGB BLD-MCNC: 12.5 G/DL (ref 13.5–16.5)
IMM GRANULOCYTES # BLD: 0.06 10*3/MM3 (ref 0–0.03)
IMM GRANULOCYTES NFR BLD: 1.1 % (ref 0–0.5)
LDLC SERPL CALC-MCNC: 108 MG/DL (ref 0–100)
LDLC/HDLC SERPL: 1.01 {RATIO}
LYMPHOCYTES # BLD AUTO: 1.4 10*3/MM3 (ref 1–3.5)
LYMPHOCYTES NFR BLD AUTO: 26.2 % (ref 20–49)
MCH RBC QN AUTO: 32.6 PG (ref 27–33)
MCHC RBC AUTO-ENTMCNC: 34.4 G/DL (ref 32–35)
MCV RBC AUTO: 94.8 FL (ref 83–97)
MONOCYTES # BLD AUTO: 0.59 10*3/MM3 (ref 0.25–0.8)
MONOCYTES NFR BLD AUTO: 11 % (ref 4–12)
NEUTROPHILS # BLD AUTO: 2.96 10*3/MM3 (ref 1.5–7)
NEUTROPHILS NFR BLD AUTO: 55.4 % (ref 39–75)
NRBC BLD MANUAL-RTO: 0 /100 WBC (ref 0–0)
PLATELET # BLD AUTO: 202 10*3/MM3 (ref 150–375)
PMV BLD AUTO: 8.5 FL (ref 8.9–12.1)
POTASSIUM BLD-SCNC: 4 MMOL/L (ref 3.5–4.7)
PROT SERPL-MCNC: 7.6 G/DL (ref 6.3–8)
RBC # BLD AUTO: 3.83 10*6/MM3 (ref 4.3–5.5)
SODIUM BLD-SCNC: 135 MMOL/L (ref 134–145)
TRIGL SERPL-MCNC: 42 MG/DL (ref 0–150)
VLDLC SERPL-MCNC: 8.4 MG/DL (ref 5–40)
WBC NRBC COR # BLD: 5.35 10*3/MM3 (ref 4–10)

## 2018-03-21 PROCEDURE — 85025 COMPLETE CBC W/AUTO DIFF WBC: CPT

## 2018-03-21 PROCEDURE — 36415 COLL VENOUS BLD VENIPUNCTURE: CPT | Performed by: INTERNAL MEDICINE

## 2018-03-21 PROCEDURE — 80053 COMPREHEN METABOLIC PANEL: CPT

## 2018-03-21 PROCEDURE — 80061 LIPID PANEL: CPT | Performed by: INTERNAL MEDICINE

## 2018-03-22 LAB
ALBUMIN SERPL-MCNC: 4 G/DL (ref 2.9–4.4)
ALBUMIN/GLOB SERPL: 1.3 {RATIO} (ref 0.7–1.7)
ALPHA1 GLOB FLD ELPH-MCNC: 0.2 G/DL (ref 0–0.4)
ALPHA2 GLOB SERPL ELPH-MCNC: 0.7 G/DL (ref 0.4–1)
B-GLOBULIN SERPL ELPH-MCNC: 0.9 G/DL (ref 0.7–1.3)
GAMMA GLOB SERPL ELPH-MCNC: 1.4 G/DL (ref 0.4–1.8)
GLOBULIN SER CALC-MCNC: 3.2 G/DL (ref 2.2–3.9)
IGA SERPL-MCNC: 62 MG/DL (ref 61–437)
IGG SERPL-MCNC: 784 MG/DL (ref 700–1600)
IGM SERPL-MCNC: 747 MG/DL (ref 15–143)
INTERPRETATION SERPL IEP-IMP: ABNORMAL
KAPPA LC SERPL-MCNC: 15 MG/L (ref 3.3–19.4)
KAPPA LC/LAMBDA SER: 0.47 {RATIO} (ref 0.26–1.65)
LAMBDA LC FREE SERPL-MCNC: 31.6 MG/L (ref 5.7–26.3)
Lab: ABNORMAL
M-SPIKE: ABNORMAL G/DL
PROT SERPL-MCNC: 7.2 G/DL (ref 6–8.5)

## 2018-03-27 ENCOUNTER — APPOINTMENT (OUTPATIENT)
Dept: OTHER | Facility: HOSPITAL | Age: 73
End: 2018-03-27

## 2018-03-27 ENCOUNTER — OFFICE VISIT (OUTPATIENT)
Dept: ONCOLOGY | Facility: CLINIC | Age: 73
End: 2018-03-27

## 2018-03-27 VITALS
RESPIRATION RATE: 16 BRPM | WEIGHT: 175 LBS | BODY MASS INDEX: 25.92 KG/M2 | HEIGHT: 69 IN | OXYGEN SATURATION: 100 % | DIASTOLIC BLOOD PRESSURE: 79 MMHG | SYSTOLIC BLOOD PRESSURE: 146 MMHG | HEART RATE: 60 BPM | TEMPERATURE: 97.4 F

## 2018-03-27 DIAGNOSIS — D47.2 MGUS (MONOCLONAL GAMMOPATHY OF UNKNOWN SIGNIFICANCE): Primary | ICD-10-CM

## 2018-03-27 LAB — VISC SER: 1.7 REL.SALINE (ref 1.6–1.9)

## 2018-03-27 PROCEDURE — G0463 HOSPITAL OUTPT CLINIC VISIT: HCPCS | Performed by: INTERNAL MEDICINE

## 2018-03-27 PROCEDURE — 99214 OFFICE O/P EST MOD 30 MIN: CPT | Performed by: INTERNAL MEDICINE

## 2018-03-27 NOTE — PROGRESS NOTES
Subjective feels well, no additional respiratory issues    REASON FOR CONSULTATION:  IgM MGUS    Provide an opinion on any further workup or treatment                             REQUESTING PHYSICIAN:  Jensen Vieira M.D.    RECORDS OBTAINED:  Records of the patients history including those obtained from the referring provider were reviewed and summarized in detail.    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 There is also  a question is because patients is history of angioedema approximately 6 years ago possibly from generic Levaquin and an additional episode in October of this year requiring ICU placement and steroids.  In conversation when seen December 15 he also describes in 1993 through 1995 an episode of ELIO treated by infectious disease and ultimately leading to a right lower lobe lobectomy to control the process.  He had to take additional antibiotic therapies over 2 years following his procedure.  Recent additional laboratory studies obtained included a total protein of 8.3, albumin 5.0, quantitative immunoglobulins with an elevated IgM 753, (60-2 63), IgA of 54 (60-3 78), IgG of 755.  Additional testing including C4 complement of 15, C1 esterase function elevating felt normal, quantitative of 38.  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  He presents for assessment of this in part as a result of his sister having Waldenström's and eventually development of further lymphoma.  He underwent a series of studies including CT of chest and abdomen showing his previous lobectomy, minimal lymphadenopathy in the abdomen thought to be reactive, no splenomegaly  , Laboratory studies demonstrating  a drop in his IgM to 705 with normal IgA and IgG, sedimentation rate of 16 , negative MARIO, serum viscosity 1.7 and essentially normal serum chemistries.    As the patient seen back January 17 he is returned from [place] having developed a pneumonia there without radiologic information but treated with Zithromax ×2 with resolution of symptoms.  He feels quite well when seen back January 17 without symptoms.  We have discussed his findings as well as recently close follow-up at this point and if IgM accelerates back up then we proceed with a marrow.  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  We have discussed recent information of the often exceeding long periods of time before this progresses if at all.  We have agreed that before marrow was done one would want to demonstrate progression of the gammopathy or gammopathies and hematologic or physical exam alterations.      We asked the patient to return for assessment of March 27, 2018.  Repeat studies including IgM of 747, free light kappa chain of 15.0, free lambda light chain of 31.6 with a ratio of 0.47 a serum viscosity of 1.7 H&H 12.5 36.3 white count of 5350,normal serum chemistries.  He continues to feel relatively well without additional respiratory issues as reviewed.  He and his wife plan traveling over the next several months.         Past Medical History:   Diagnosis Date   • H/O Angioedema 10/29/2017   • H/O Transient ischemic attack (TIA) 1954   • H/O Tuberculosis     ELIO   • Heart disease    • History of foreign travel 2017    [place]; [place]; [place]   • ELIO (mycobacterium avium-intracellulare)    • Monoclonal gammopathy    • Seizures 1970   • Urethral trauma 1978        Past Surgical History:   Procedure Laterality Date   • LUNG LOBECTOMY  1993    Right Lower   • TONSILLECTOMY AND ADENOIDECTOMY  1954        Current Outpatient Prescriptions on File Prior  to Visit   Medication Sig Dispense Refill   • Aspirin Buf,[id number], (ASCRIPTIN) 325 MG tablet Take  by mouth.     • bisoprolol (ZEBeta) 5 MG tablet Take 5 mg by mouth Daily.     • Cholecalciferol (DIALYVITE VITAMIN D 5000 PO) Take  by mouth.     • hydrochlorothiazide (HYDRODIURIL) 25 MG tablet Take 25 mg by mouth Daily.     • phenytoin (DILANTIN) 100 MG ER capsule Take 300 mg by mouth 2 (Two) Times a Day.     • phenytoin (DILANTIN) 30 MG ER capsule Take  by mouth.     • rosuvastatin (CRESTOR) 20 MG tablet take 1 tablet by mouth once daily  0   • [DISCONTINUED] amLODIPine (NORVASC) 10 MG tablet take 1 tablet by mouth once daily  0     No current facility-administered medications on file prior to visit.         ALLERGIES:    Allergies   Allergen Reactions   • Cefdinir    • Levaquin [Levofloxacin] Other (See Comments)     angioadema loryngeldema   • Other      All ace inhibitors  AND  ARE   • Rifampin Other (See Comments)     Drug induced Hep        Social History     Social History   • Marital status:      Spouse name: Yenny   • Years of education: Medical school     Occupational History   • Interventional radiologist Retired     Social History Main Topics   • Smoking status: Never Smoker   • Smokeless tobacco: Never Used   • Alcohol use 1.2 oz/week     2 Glasses of wine per week      Comment: 3-4 times week    • Drug use: No   • Sexual activity: Defer     Other Topics Concern   • Not on file        Family History   Problem Relation Age of Onset   • Breast cancer Mother    • Heart disease Father    • Pulmonary embolism Father    • Breast cancer Sister    • Diabetes Sister    • Anemia Sister    • Lymphoma Brother         Review of Systems   See history of present illness per angioedema this past summer possibly as result of ace inhibitor use   Objective     Vitals:    03/27/18 0827   BP: 146/79   Pulse: 60   Resp: 16   Temp: 97.4 °F (36.3 °C)   TempSrc: Oral   SpO2: 100%   Weight: 79.4 kg (175 lb)  "  Height: 175.7 cm (69.17\")   PainSc: 0-No pain     Current Status 3/27/2018   ECOG score 0       Physical Exam    Constitutional: He is oriented to person, place, and time. He appears well-developed and well-nourished.   HENT:   Head: Normocephalic.   Eyes: Pupils are equal, round, and reactive to light.   Neck: Normal range of motion. No JVD present. Carotid bruit is not present. No thyromegaly present.   Cardiovascular: Normal rate, regular rhythm, S1 normal, S2 normal, normal heart sounds and intact distal pulses.  Exam reveals no gallop and no friction rub.    No murmur heard.  Pulmonary/Chest: Effort normal and breath sounds normal.   Abdominal: Soft. Bowel sounds are normal.   Musculoskeletal: He exhibits no edema.   Neurological: He is alert and oriented to person, place, and time.   Skin: Skin is warm, dry and intact. No erythema.   Psychiatric: He has a normal mood and affect  RECENT LABS:  Hematology WBC   Date Value Ref Range Status   03/21/2018 5.35 4.00 - 10.00 10*3/mm3 Final     RBC   Date Value Ref Range Status   03/21/2018 3.83 (L) 4.30 - 5.50 10*6/mm3 Final     Hemoglobin   Date Value Ref Range Status   03/21/2018 12.5 (L) 13.5 - 16.5 g/dL Final     Hematocrit   Date Value Ref Range Status   03/21/2018 36.3 (L) 40.0 - 49.0 % Final     Platelets   Date Value Ref Range Status   03/21/2018 202 150 - 375 10*3/mm3 Final      CT CHEST, ABDOMEN AND PELVIS WITH CONTRAST  December 17, 2017  IMPRESSION:  1. Status post right lower lobectomy since prior imaging in 2014. There  are scattered cluster of nodules and reticulonodular infiltrate  demonstrated bilaterally, likely a result of chronic or sequela of  low-grade infection. Mixed density nodules seen within the left lower  lobe and anterior right upper lobe are likely a sequela of infection as  well however, continued follow-up is necessary.  2. Within the abdomen, there are mildly prominent and low-density lymph  nodes seen within the mesentery " retroperitoneum, periportal location as  detailed above. Correlation with any prior imaging of the abdomen would  be most helpful. The differential includes lymphadenopathy from sequela  from prior mycobacterium infection, reactive lymphadenopathy or other.  No splenomegaly. In the absence of remote imaging, consider follow-up  with CT imaging.          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  Recent laboratory studies done through his primary care physician has revealed an evident elevated IgM level which is reported as monoclonal but does not appear to have been quantitated per the actual M spike.  We have discussed his past medical history in detail today and find his physical exam does not reveal evidence of lymphadenopathy or hepatosplenomegaly and that per additional laboratory studies he is not having significant anemia, hypercalcemia or renal dysfunction.  Furthermore he does not have neurologic symptoms though does have a history of seizures described above and is been on long-term Dilantin which, may itself, produce abnormalities inimmunoglobulin levels.  We have discussed IgM MGUS diagnostic criteria as consistent with an IgM M protein less than 3 g/dL, no evidence again of hypercalcemia, renal insufficiency, anemia or bone lesions and no constitutional symptoms as well as less than 10% clonal plasma cells.  As result of the above we had him proceed - assessment per CT scan of chest abdomen pelvis to determine any additional lymphadenopathy, recheck his paraprotein levels with both protein and immunoelectrophoresis, quantitative immunoglobulins, serum viscosity and free light chain analyses as well as  MARIO and sedimentation rate as  baseline analysis.     Studies, reviewed above and with the patient and his wife when seen January 17 are not particularly directive of significant abnormalities at this point other than IgM monoclonal gammopathies.  It is felt this is best observed at this point unless he demonstrates hematologic worsening or physical exam findings that would suggest progression.  At a result we planned observation rechecking him at 3 months and is next seen March 27 with no significant change in his paraprotein is, chemistries are viscosity levels.  We'll plan at this point to reassess him now in 4 months with similar laboratory studies once again and if doing well we'll go out to six-month checks.  He is wife are agreeable with this plan and follow-up.

## 2018-07-09 ENCOUNTER — LAB (OUTPATIENT)
Dept: LAB | Facility: HOSPITAL | Age: 73
End: 2018-07-09

## 2018-07-09 DIAGNOSIS — D47.2 MGUS (MONOCLONAL GAMMOPATHY OF UNKNOWN SIGNIFICANCE): Primary | ICD-10-CM

## 2018-07-09 LAB
ALBUMIN SERPL-MCNC: 4.5 G/DL (ref 3.5–5.2)
ALBUMIN/GLOB SERPL: 1.6 G/DL (ref 1.1–2.4)
ALP SERPL-CCNC: 83 U/L (ref 38–116)
ALT SERPL W P-5'-P-CCNC: 20 U/L (ref 0–41)
ANION GAP SERPL CALCULATED.3IONS-SCNC: 11.9 MMOL/L
AST SERPL-CCNC: 25 U/L (ref 0–40)
BASOPHILS # BLD AUTO: 0.06 10*3/MM3 (ref 0–0.1)
BASOPHILS NFR BLD AUTO: 1 % (ref 0–1.1)
BILIRUB SERPL-MCNC: 0.3 MG/DL (ref 0.1–1.2)
BUN BLD-MCNC: 27 MG/DL (ref 6–20)
BUN/CREAT SERPL: 27.3 (ref 7.3–30)
CALCIUM SPEC-SCNC: 9.4 MG/DL (ref 8.5–10.2)
CHLORIDE SERPL-SCNC: 99 MMOL/L (ref 98–107)
CO2 SERPL-SCNC: 30.1 MMOL/L (ref 22–29)
CREAT BLD-MCNC: 0.99 MG/DL (ref 0.7–1.3)
DEPRECATED RDW RBC AUTO: 47 FL (ref 37–49)
EOSINOPHIL # BLD AUTO: 0.27 10*3/MM3 (ref 0–0.36)
EOSINOPHIL NFR BLD AUTO: 4.3 % (ref 1–5)
ERYTHROCYTE [DISTWIDTH] IN BLOOD BY AUTOMATED COUNT: 13.6 % (ref 11.7–14.5)
GFR SERPL CREATININE-BSD FRML MDRD: 75 ML/MIN/1.73
GLOBULIN UR ELPH-MCNC: 2.9 GM/DL (ref 1.8–3.5)
GLUCOSE BLD-MCNC: 98 MG/DL (ref 74–124)
HCT VFR BLD AUTO: 37.7 % (ref 40–49)
HGB BLD-MCNC: 13 G/DL (ref 13.5–16.5)
IMM GRANULOCYTES # BLD: 0.07 10*3/MM3 (ref 0–0.03)
IMM GRANULOCYTES NFR BLD: 1.1 % (ref 0–0.5)
LYMPHOCYTES # BLD AUTO: 1.36 10*3/MM3 (ref 1–3.5)
LYMPHOCYTES NFR BLD AUTO: 21.9 % (ref 20–49)
MCH RBC QN AUTO: 32.4 PG (ref 27–33)
MCHC RBC AUTO-ENTMCNC: 34.5 G/DL (ref 32–35)
MCV RBC AUTO: 94 FL (ref 83–97)
MONOCYTES # BLD AUTO: 0.63 10*3/MM3 (ref 0.25–0.8)
MONOCYTES NFR BLD AUTO: 10.1 % (ref 4–12)
NEUTROPHILS # BLD AUTO: 3.82 10*3/MM3 (ref 1.5–7)
NEUTROPHILS NFR BLD AUTO: 61.6 % (ref 39–75)
NRBC BLD MANUAL-RTO: 0 /100 WBC (ref 0–0)
PLATELET # BLD AUTO: 224 10*3/MM3 (ref 150–375)
PMV BLD AUTO: 8.9 FL (ref 8.9–12.1)
POTASSIUM BLD-SCNC: 4.9 MMOL/L (ref 3.5–4.7)
PROT SERPL-MCNC: 7.4 G/DL (ref 6.3–8)
RBC # BLD AUTO: 4.01 10*6/MM3 (ref 4.3–5.5)
SODIUM BLD-SCNC: 141 MMOL/L (ref 134–145)
WBC NRBC COR # BLD: 6.21 10*3/MM3 (ref 4–10)

## 2018-07-09 PROCEDURE — 80053 COMPREHEN METABOLIC PANEL: CPT | Performed by: INTERNAL MEDICINE

## 2018-07-09 PROCEDURE — 85025 COMPLETE CBC W/AUTO DIFF WBC: CPT | Performed by: INTERNAL MEDICINE

## 2018-07-09 PROCEDURE — 36415 COLL VENOUS BLD VENIPUNCTURE: CPT | Performed by: INTERNAL MEDICINE

## 2018-07-10 LAB
ALBUMIN SERPL-MCNC: 4.2 G/DL (ref 2.9–4.4)
ALBUMIN/GLOB SERPL: 1.5 {RATIO} (ref 0.7–1.7)
ALPHA1 GLOB FLD ELPH-MCNC: 0.2 G/DL (ref 0–0.4)
ALPHA2 GLOB SERPL ELPH-MCNC: 0.7 G/DL (ref 0.4–1)
B-GLOBULIN SERPL ELPH-MCNC: 0.9 G/DL (ref 0.7–1.3)
GAMMA GLOB SERPL ELPH-MCNC: 1.3 G/DL (ref 0.4–1.8)
GLOBULIN SER CALC-MCNC: 3 G/DL (ref 2.2–3.9)
IGA SERPL-MCNC: 63 MG/DL (ref 61–437)
IGG SERPL-MCNC: 732 MG/DL (ref 700–1600)
IGM SERPL-MCNC: 782 MG/DL (ref 15–143)
INTERPRETATION SERPL IEP-IMP: ABNORMAL
KAPPA LC SERPL-MCNC: 17.1 MG/L (ref 3.3–19.4)
KAPPA LC/LAMBDA SER: 0.42 {RATIO} (ref 0.26–1.65)
LAMBDA LC FREE SERPL-MCNC: 41.1 MG/L (ref 5.7–26.3)
Lab: ABNORMAL
M-SPIKE: ABNORMAL G/DL
PROT SERPL-MCNC: 7.2 G/DL (ref 6–8.5)

## 2018-07-11 LAB — VISC SER: 2.4 REL.SALINE (ref 1.6–1.9)

## 2018-07-12 ENCOUNTER — OFFICE VISIT (OUTPATIENT)
Dept: ONCOLOGY | Facility: CLINIC | Age: 73
End: 2018-07-12

## 2018-07-12 ENCOUNTER — APPOINTMENT (OUTPATIENT)
Dept: LAB | Facility: HOSPITAL | Age: 73
End: 2018-07-12

## 2018-07-12 VITALS
WEIGHT: 171.2 LBS | HEART RATE: 59 BPM | BODY MASS INDEX: 25.36 KG/M2 | TEMPERATURE: 97.5 F | RESPIRATION RATE: 12 BRPM | OXYGEN SATURATION: 100 % | HEIGHT: 69 IN | DIASTOLIC BLOOD PRESSURE: 66 MMHG | SYSTOLIC BLOOD PRESSURE: 124 MMHG

## 2018-07-12 DIAGNOSIS — D47.2 MGUS (MONOCLONAL GAMMOPATHY OF UNKNOWN SIGNIFICANCE): Primary | ICD-10-CM

## 2018-07-12 PROCEDURE — 99214 OFFICE O/P EST MOD 30 MIN: CPT | Performed by: INTERNAL MEDICINE

## 2018-07-12 PROCEDURE — G0463 HOSPITAL OUTPT CLINIC VISIT: HCPCS | Performed by: INTERNAL MEDICINE

## 2018-07-12 RX ORDER — AMLODIPINE BESYLATE 5 MG/1
5 TABLET ORAL DAILY
Refills: 0 | COMMUNITY
Start: 2018-07-01 | End: 2020-06-04 | Stop reason: DRUGHIGH
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
## 2018-09-20 ENCOUNTER — OFFICE VISIT (OUTPATIENT)
Dept: NEUROLOGY | Facility: CLINIC | Age: 73
End: 2018-09-20

## 2018-09-20 VITALS
DIASTOLIC BLOOD PRESSURE: 82 MMHG | SYSTOLIC BLOOD PRESSURE: 132 MMHG | BODY MASS INDEX: 25.42 KG/M2 | HEIGHT: 69 IN | OXYGEN SATURATION: 100 % | WEIGHT: 171.6 LBS | HEART RATE: 76 BPM

## 2018-09-20 DIAGNOSIS — T50.905D ADVERSE EFFECT OF DRUG, SUBSEQUENT ENCOUNTER: ICD-10-CM

## 2018-09-20 DIAGNOSIS — R26.89 LOSS OF BALANCE: ICD-10-CM

## 2018-09-20 DIAGNOSIS — G40.009 PARTIAL IDIOPATHIC EPILEPSY WITH SEIZURES OF LOCALIZED ONSET, NOT INTRACTABLE, WITHOUT STATUS EPILEPTICUS (HCC): Primary | ICD-10-CM

## 2018-09-20 PROCEDURE — 99213 OFFICE O/P EST LOW 20 MIN: CPT | Performed by: PSYCHIATRY & NEUROLOGY

## 2018-09-20 NOTE — PROGRESS NOTES
Subjective:     Patient ID: Ted Morales is a 73 y.o. male.    History of Present Illness  The following portions of the patient's history were reviewed and updated as appropriate: allergies, current medications, past family history, past medical history, past social history, past surgical history and problem list.      MGUS- last sept, after a repeat bout of angioedema, while in [place], was in ICU for pulmonary symptoms, then home. Allergy exam found MGUS with M spike. All is stable.  [Sister has Waldenstroms and lymhoma].The ACE inhibitor OR the MGUS can cause angioedema. SO ACE was d/cd. IM, Dr. Vieira is watching all the labs.  No new rx for this at this time.      EPILEPSY- no seizures.  Epilepsy: This physician has no me for many years.  I saw him at least 25 years ago for history of a well-controlled seizure disorder while taking Dilantin and doing well.  More than 13 years ago he had a seizure while in [place].  That time his Dilantin level had fallen to less than 10 and since that time he has been seizure free and the Dilantin dose has been about 330 mg daily.       DILANTIN LAB   8/9/18  Free-1.5 total -23.    LOB-mild and chronic, likely AE of chronic phenytoin use.    He agreed to have PT eval and rx at [facility] for this problem.      Review of Systems   Constitutional: Negative for activity change, appetite change and fatigue.   HENT: Negative for ear pain, facial swelling and trouble swallowing.    Eyes: Negative for photophobia, pain and visual disturbance.   Respiratory: Negative for choking, chest tightness and shortness of breath.    Cardiovascular: Negative for chest pain, palpitations and leg swelling.   Gastrointestinal: Negative for abdominal pain, constipation and nausea.   Endocrine: Negative for polydipsia, polyphagia and polyuria.   Genitourinary: Negative for difficulty urinating, frequency and urgency.   Musculoskeletal: Positive for gait problem. Negative for back pain and neck  pain.   Skin: Negative for color change, rash and wound.   Allergic/Immunologic: Negative for environmental allergies, food allergies and immunocompromised state.   Neurological: Negative for dizziness, tremors, seizures, syncope, facial asymmetry, speech difficulty, weakness, light-headedness, numbness and headaches.   Hematological: Negative for adenopathy. Bruises/bleeds easily.   Psychiatric/Behavioral: Negative for agitation, behavioral problems, confusion, decreased concentration, dysphoric mood, hallucinations, self-injury, sleep disturbance and suicidal ideas. The patient is not nervous/anxious and is not hyperactive.         Objective:    Neurologic Exam     Mental Status   Oriented to person, place, and time.   Attention: normal. Concentration: normal.   Speech: speech is normal   Knowledge: good.   Able to name object. Able to read. Able to repeat. Able to write. Normal comprehension.     Cranial Nerves     CN II   Visual fields full to confrontation.     CN III, IV, VI   Pupils are equal, round, and reactive to light.  Extraocular motions are normal.   Right pupil: Size: 4 mm.   Left pupil: Size: 4 mm.     CN VII   Facial expression full, symmetric.     CN VIII   Hearing: intact    CN XI   CN XI normal.     CN XII   CN XII normal.     Motor Exam   Muscle bulk: normal  Overall muscle tone: normalNo  Distal atrophy     Sensory Exam   Normal vibration and pinprick at the feet and hands     Gait, Coordination, and Reflexes     Gait  Gait: normal    Coordination   Romberg: negative  Tandem walking coordination: normal    Tremor   Resting tremor: absent  Intention tremor: absent  Action tremor: absent    Reflexes   Right patellar: 1+  Left patellar: 1+  Right achilles: 1+  Left achilles: 1+Ankle reflexes about trace       Physical Exam   Constitutional: He is oriented to person, place, and time. He appears well-developed and well-nourished.   Eyes: Pupils are equal, round, and reactive to light. EOM are  normal.   Neck: Normal range of motion. Neck supple.   Cardiovascular: Normal rate.    Pulmonary/Chest: Effort normal.   Neurological: He is oriented to person, place, and time. He has a normal Romberg Test and a normal Tandem Gait Test. Gait normal.   Reflex Scores:       Patellar reflexes are 1+ on the right side and 1+ on the left side.       Achilles reflexes are 1+ on the right side and 1+ on the left side.  Psychiatric: He has a normal mood and affect. His speech is normal and behavior is normal. Judgment and thought content normal.   Nursing note and vitals reviewed.      Assessment/Plan:       Problems Addressed this Visit        Unprioritized    Partial idiopathic epilepsy with seizures of localized onset, not intractable, without status epilepticus (CMS/HCC) - Primary    Adverse drug effect    Loss of balance    Relevant Orders    Ambulatory Referral to Physical Therapy Evaluate and treat       His seizure control remains excellent.  No need to reduce or change Dilantin with the free level of 1.5 He has elevated protein binding because of his gammopathy.  We did explained this situation.  In the future he needs to remain on brand-name phenytoin    MGUS- not associated with phenytoin although lymphadenopathy has been noted as a side effect of phenytoin.  Both can cause symptoms of neuropathy..    Mild ataxic symptoms not seen on exam but I believe that some further therapy at [facility] is indicated and he agreed with this plan    Annual follow-up

## 2018-12-13 ENCOUNTER — LAB (OUTPATIENT)
Dept: LAB | Facility: HOSPITAL | Age: 73
End: 2018-12-13

## 2018-12-13 DIAGNOSIS — D47.2 MGUS (MONOCLONAL GAMMOPATHY OF UNKNOWN SIGNIFICANCE): ICD-10-CM

## 2018-12-13 LAB
ALBUMIN SERPL-MCNC: 4.6 G/DL (ref 3.5–5.2)
ALBUMIN/GLOB SERPL: 1.5 G/DL (ref 1.1–2.4)
ALP SERPL-CCNC: 109 U/L (ref 38–116)
ALT SERPL W P-5'-P-CCNC: 21 U/L (ref 0–41)
ANION GAP SERPL CALCULATED.3IONS-SCNC: 14 MMOL/L
AST SERPL-CCNC: 25 U/L (ref 0–40)
BASOPHILS # BLD AUTO: 0.06 10*3/MM3 (ref 0–0.1)
BASOPHILS NFR BLD AUTO: 1.3 % (ref 0–1.1)
BILIRUB SERPL-MCNC: 0.2 MG/DL (ref 0.1–1.2)
BUN BLD-MCNC: 33 MG/DL (ref 6–20)
BUN/CREAT SERPL: 33.7 (ref 7.3–30)
CALCIUM SPEC-SCNC: 9.6 MG/DL (ref 8.5–10.2)
CHLORIDE SERPL-SCNC: 97 MMOL/L (ref 98–107)
CO2 SERPL-SCNC: 28 MMOL/L (ref 22–29)
CREAT BLD-MCNC: 0.98 MG/DL (ref 0.7–1.3)
DEPRECATED RDW RBC AUTO: 46.4 FL (ref 37–49)
EOSINOPHIL # BLD AUTO: 0.21 10*3/MM3 (ref 0–0.36)
EOSINOPHIL NFR BLD AUTO: 4.5 % (ref 1–5)
ERYTHROCYTE [DISTWIDTH] IN BLOOD BY AUTOMATED COUNT: 13.5 % (ref 11.7–14.5)
GFR SERPL CREATININE-BSD FRML MDRD: 75 ML/MIN/1.73
GLOBULIN UR ELPH-MCNC: 3.1 GM/DL (ref 1.8–3.5)
GLUCOSE BLD-MCNC: 107 MG/DL (ref 74–124)
HCT VFR BLD AUTO: 36.9 % (ref 40–49)
HGB BLD-MCNC: 12.3 G/DL (ref 13.5–16.5)
IMM GRANULOCYTES # BLD: 0.07 10*3/MM3 (ref 0–0.03)
IMM GRANULOCYTES NFR BLD: 1.5 % (ref 0–0.5)
LYMPHOCYTES # BLD AUTO: 1.51 10*3/MM3 (ref 1–3.5)
LYMPHOCYTES NFR BLD AUTO: 32.3 % (ref 20–49)
MCH RBC QN AUTO: 31.5 PG (ref 27–33)
MCHC RBC AUTO-ENTMCNC: 33.3 G/DL (ref 32–35)
MCV RBC AUTO: 94.4 FL (ref 83–97)
MONOCYTES # BLD AUTO: 0.6 10*3/MM3 (ref 0.25–0.8)
MONOCYTES NFR BLD AUTO: 12.8 % (ref 4–12)
NEUTROPHILS # BLD AUTO: 2.22 10*3/MM3 (ref 1.5–7)
NEUTROPHILS NFR BLD AUTO: 47.6 % (ref 39–75)
NRBC BLD MANUAL-RTO: 0 /100 WBC (ref 0–0)
PLATELET # BLD AUTO: 253 10*3/MM3 (ref 150–375)
PMV BLD AUTO: 8.4 FL (ref 8.9–12.1)
POTASSIUM BLD-SCNC: 4.7 MMOL/L (ref 3.5–4.7)
PROT SERPL-MCNC: 7.7 G/DL (ref 6.3–8)
RBC # BLD AUTO: 3.91 10*6/MM3 (ref 4.3–5.5)
SODIUM BLD-SCNC: 139 MMOL/L (ref 134–145)
WBC NRBC COR # BLD: 4.67 10*3/MM3 (ref 4–10)

## 2018-12-13 PROCEDURE — 85025 COMPLETE CBC W/AUTO DIFF WBC: CPT | Performed by: INTERNAL MEDICINE

## 2018-12-13 PROCEDURE — 80053 COMPREHEN METABOLIC PANEL: CPT | Performed by: INTERNAL MEDICINE

## 2018-12-13 PROCEDURE — 36415 COLL VENOUS BLD VENIPUNCTURE: CPT | Performed by: INTERNAL MEDICINE

## 2018-12-14 LAB
ALBUMIN SERPL-MCNC: 4.2 G/DL (ref 2.9–4.4)
ALBUMIN/GLOB SERPL: 1.3 {RATIO} (ref 0.7–1.7)
ALPHA1 GLOB FLD ELPH-MCNC: 0.3 G/DL (ref 0–0.4)
ALPHA2 GLOB SERPL ELPH-MCNC: 0.8 G/DL (ref 0.4–1)
B-GLOBULIN SERPL ELPH-MCNC: 1 G/DL (ref 0.7–1.3)
GAMMA GLOB SERPL ELPH-MCNC: 1.3 G/DL (ref 0.4–1.8)
GLOBULIN SER CALC-MCNC: 3.3 G/DL (ref 2.2–3.9)
IGA SERPL-MCNC: 54 MG/DL (ref 61–437)
IGG SERPL-MCNC: 784 MG/DL (ref 700–1600)
IGM SERPL-MCNC: 799 MG/DL (ref 15–143)
INTERPRETATION SERPL IEP-IMP: ABNORMAL
KAPPA LC SERPL-MCNC: 17 MG/L (ref 3.3–19.4)
KAPPA LC/LAMBDA SER: 0.47 {RATIO} (ref 0.26–1.65)
LAMBDA LC FREE SERPL-MCNC: 36.1 MG/L (ref 5.7–26.3)
Lab: ABNORMAL
M-SPIKE: ABNORMAL G/DL
PROT SERPL-MCNC: 7.5 G/DL (ref 6–8.5)

## 2018-12-20 ENCOUNTER — APPOINTMENT (OUTPATIENT)
Dept: LAB | Facility: HOSPITAL | Age: 73
End: 2018-12-20

## 2018-12-20 ENCOUNTER — OFFICE VISIT (OUTPATIENT)
Dept: ONCOLOGY | Facility: CLINIC | Age: 73
End: 2018-12-20

## 2018-12-20 VITALS
HEIGHT: 69 IN | WEIGHT: 171.1 LBS | HEART RATE: 66 BPM | DIASTOLIC BLOOD PRESSURE: 72 MMHG | OXYGEN SATURATION: 98 % | RESPIRATION RATE: 18 BRPM | TEMPERATURE: 98.4 F | SYSTOLIC BLOOD PRESSURE: 151 MMHG | BODY MASS INDEX: 25.34 KG/M2

## 2018-12-20 DIAGNOSIS — D47.2 MGUS (MONOCLONAL GAMMOPATHY OF UNKNOWN SIGNIFICANCE): Primary | ICD-10-CM

## 2018-12-20 PROCEDURE — 99214 OFFICE O/P EST MOD 30 MIN: CPT | Performed by: INTERNAL MEDICINE

## 2018-12-20 PROCEDURE — G0463 HOSPITAL OUTPT CLINIC VISIT: HCPCS | Performed by: INTERNAL MEDICINE
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
## 2019-01-11 ENCOUNTER — DOCUMENTATION (OUTPATIENT)
Dept: ONCOLOGY | Facility: CLINIC | Age: 74
End: 2019-01-11

## 2019-01-11 NOTE — PROGRESS NOTES
Pt called stating that he has been having allergy eye symptoms.  He stated his allergist wanted to give him a steroid eye drop and he wanted to make sure that its ok to use in regards to his MGUS.  Reviewed with Dr Luke (Dr Yarbrough out today) and she advised that it would be no problem to use.  Pt advised and understanding noted

## 2019-02-25 ENCOUNTER — LAB (OUTPATIENT)
Dept: LAB | Facility: HOSPITAL | Age: 74
End: 2019-02-25

## 2019-02-25 ENCOUNTER — CLINICAL SUPPORT (OUTPATIENT)
Dept: ONCOLOGY | Facility: HOSPITAL | Age: 74
End: 2019-02-25

## 2019-02-25 VITALS
HEART RATE: 65 BPM | TEMPERATURE: 97.6 F | SYSTOLIC BLOOD PRESSURE: 152 MMHG | OXYGEN SATURATION: 99 % | DIASTOLIC BLOOD PRESSURE: 81 MMHG

## 2019-02-25 DIAGNOSIS — D47.2 MGUS (MONOCLONAL GAMMOPATHY OF UNKNOWN SIGNIFICANCE): ICD-10-CM

## 2019-02-25 LAB
BASOPHILS # BLD AUTO: 0.06 10*3/MM3 (ref 0–0.2)
BASOPHILS NFR BLD AUTO: 0.9 % (ref 0–1.5)
DEPRECATED RDW RBC AUTO: 46.8 FL (ref 37–54)
EOSINOPHIL # BLD AUTO: 0.26 10*3/MM3 (ref 0–0.4)
EOSINOPHIL NFR BLD AUTO: 4 % (ref 0.3–6.2)
ERYTHROCYTE [DISTWIDTH] IN BLOOD BY AUTOMATED COUNT: 14.1 % (ref 12.3–15.4)
HCT VFR BLD AUTO: 34.7 % (ref 37.5–51)
HGB BLD-MCNC: 12.2 G/DL (ref 13–17.7)
IMM GRANULOCYTES # BLD AUTO: 0.08 10*3/MM3 (ref 0–0.05)
IMM GRANULOCYTES NFR BLD AUTO: 1.2 % (ref 0–0.5)
LYMPHOCYTES # BLD AUTO: 1.51 10*3/MM3 (ref 0.7–3.1)
LYMPHOCYTES NFR BLD AUTO: 23.3 % (ref 19.6–45.3)
MCH RBC QN AUTO: 31.9 PG (ref 26.6–33)
MCHC RBC AUTO-ENTMCNC: 35.2 G/DL (ref 31.5–35.7)
MCV RBC AUTO: 90.8 FL (ref 79–97)
MONOCYTES # BLD AUTO: 0.83 10*3/MM3 (ref 0.1–0.9)
MONOCYTES NFR BLD AUTO: 12.8 % (ref 5–12)
NEUTROPHILS # BLD AUTO: 3.75 10*3/MM3 (ref 1.4–7)
NEUTROPHILS NFR BLD AUTO: 57.8 % (ref 42.7–76)
NRBC BLD AUTO-RTO: 0 /100 WBC (ref 0–0)
PLATELET # BLD AUTO: 212 10*3/MM3 (ref 140–450)
PMV BLD AUTO: 9 FL (ref 6–12)
RBC # BLD AUTO: 3.82 10*6/MM3 (ref 4.14–5.8)
WBC NRBC COR # BLD: 6.49 10*3/MM3 (ref 3.4–10.8)

## 2019-02-25 PROCEDURE — 36416 COLLJ CAPILLARY BLOOD SPEC: CPT | Performed by: INTERNAL MEDICINE

## 2019-02-25 PROCEDURE — 85025 COMPLETE CBC W/AUTO DIFF WBC: CPT | Performed by: INTERNAL MEDICINE

## 2019-02-25 NOTE — PROGRESS NOTES
Pt is here for lab with RN review.  CBC reviewed with pt, counts are stable for this pt at this time. Pt has no complaints and VSS.  Copy of labs given to pt and f/u appt reviewed. Pt is instructed to call with concerns prior to next visit. Pt V/U.   Lab Results   Component Value Date    WBC 6.49 02/25/2019    HGB 12.2 (L) 02/25/2019    HCT 34.7 (L) 02/25/2019    MCV 90.8 02/25/2019     02/25/2019

## 2019-05-16 ENCOUNTER — APPOINTMENT (OUTPATIENT)
Dept: LAB | Facility: HOSPITAL | Age: 74
End: 2019-05-16
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
## 2019-05-20 ENCOUNTER — LAB (OUTPATIENT)
Dept: LAB | Facility: HOSPITAL | Age: 74
End: 2019-05-20

## 2019-05-20 DIAGNOSIS — D47.2 MGUS (MONOCLONAL GAMMOPATHY OF UNKNOWN SIGNIFICANCE): ICD-10-CM

## 2019-05-20 LAB
ALBUMIN SERPL-MCNC: 4.6 G/DL (ref 3.5–5.2)
ALBUMIN/GLOB SERPL: 1.6 G/DL (ref 1.1–2.4)
ALP SERPL-CCNC: 92 U/L (ref 38–116)
ALT SERPL W P-5'-P-CCNC: 27 U/L (ref 0–41)
ANION GAP SERPL CALCULATED.3IONS-SCNC: 11.5 MMOL/L
AST SERPL-CCNC: 32 U/L (ref 0–40)
BASOPHILS # BLD AUTO: 0.06 10*3/MM3 (ref 0–0.2)
BASOPHILS NFR BLD AUTO: 1.2 % (ref 0–1.5)
BILIRUB SERPL-MCNC: 0.2 MG/DL (ref 0.2–1.2)
BUN BLD-MCNC: 29 MG/DL (ref 6–20)
BUN/CREAT SERPL: 29.6 (ref 7.3–30)
CALCIUM SPEC-SCNC: 9.2 MG/DL (ref 8.5–10.2)
CHLORIDE SERPL-SCNC: 97 MMOL/L (ref 98–107)
CO2 SERPL-SCNC: 28.5 MMOL/L (ref 22–29)
CREAT BLD-MCNC: 0.98 MG/DL (ref 0.7–1.3)
DEPRECATED RDW RBC AUTO: 47.1 FL (ref 37–54)
EOSINOPHIL # BLD AUTO: 0.31 10*3/MM3 (ref 0–0.4)
EOSINOPHIL NFR BLD AUTO: 6.4 % (ref 0.3–6.2)
ERYTHROCYTE [DISTWIDTH] IN BLOOD BY AUTOMATED COUNT: 13.5 % (ref 12.3–15.4)
GFR SERPL CREATININE-BSD FRML MDRD: 75 ML/MIN/1.73
GLOBULIN UR ELPH-MCNC: 2.8 GM/DL (ref 1.8–3.5)
GLUCOSE BLD-MCNC: 97 MG/DL (ref 74–124)
HCT VFR BLD AUTO: 38 % (ref 37.5–51)
HGB BLD-MCNC: 12.8 G/DL (ref 13–17.7)
IMM GRANULOCYTES # BLD AUTO: 0.04 10*3/MM3 (ref 0–0.05)
IMM GRANULOCYTES NFR BLD AUTO: 0.8 % (ref 0–0.5)
LYMPHOCYTES # BLD AUTO: 1.36 10*3/MM3 (ref 0.7–3.1)
LYMPHOCYTES NFR BLD AUTO: 28 % (ref 19.6–45.3)
MCH RBC QN AUTO: 31.9 PG (ref 26.6–33)
MCHC RBC AUTO-ENTMCNC: 33.7 G/DL (ref 31.5–35.7)
MCV RBC AUTO: 94.8 FL (ref 79–97)
MONOCYTES # BLD AUTO: 0.5 10*3/MM3 (ref 0.1–0.9)
MONOCYTES NFR BLD AUTO: 10.3 % (ref 5–12)
NEUTROPHILS # BLD AUTO: 2.59 10*3/MM3 (ref 1.7–7)
NEUTROPHILS NFR BLD AUTO: 53.3 % (ref 42.7–76)
NRBC BLD AUTO-RTO: 0 /100 WBC (ref 0–0.2)
PLATELET # BLD AUTO: 197 10*3/MM3 (ref 140–450)
PMV BLD AUTO: 8.5 FL (ref 6–12)
POTASSIUM BLD-SCNC: 4.3 MMOL/L (ref 3.5–4.7)
PROT SERPL-MCNC: 7.4 G/DL (ref 6.3–8)
RBC # BLD AUTO: 4.01 10*6/MM3 (ref 4.14–5.8)
SODIUM BLD-SCNC: 137 MMOL/L (ref 134–145)
WBC NRBC COR # BLD: 4.86 10*3/MM3 (ref 3.4–10.8)

## 2019-05-20 PROCEDURE — 36415 COLL VENOUS BLD VENIPUNCTURE: CPT | Performed by: INTERNAL MEDICINE

## 2019-05-20 PROCEDURE — 80053 COMPREHEN METABOLIC PANEL: CPT | Performed by: INTERNAL MEDICINE

## 2019-05-20 PROCEDURE — 85025 COMPLETE CBC W/AUTO DIFF WBC: CPT | Performed by: INTERNAL MEDICINE

## 2019-05-21 LAB
ALBUMIN SERPL-MCNC: 4 G/DL (ref 2.9–4.4)
ALBUMIN/GLOB SERPL: 1.3 {RATIO} (ref 0.7–1.7)
ALPHA1 GLOB FLD ELPH-MCNC: 0.2 G/DL (ref 0–0.4)
ALPHA2 GLOB SERPL ELPH-MCNC: 0.8 G/DL (ref 0.4–1)
B-GLOBULIN SERPL ELPH-MCNC: 0.9 G/DL (ref 0.7–1.3)
GAMMA GLOB SERPL ELPH-MCNC: 1.4 G/DL (ref 0.4–1.8)
GLOBULIN SER CALC-MCNC: 3.2 G/DL (ref 2.2–3.9)
IGA SERPL-MCNC: 46 MG/DL (ref 61–437)
IGG SERPL-MCNC: 695 MG/DL (ref 700–1600)
IGM SERPL-MCNC: 763 MG/DL (ref 15–143)
INTERPRETATION SERPL IEP-IMP: ABNORMAL
KAPPA LC SERPL-MCNC: 18.6 MG/L (ref 3.3–19.4)
KAPPA LC/LAMBDA SER: 0.44 {RATIO} (ref 0.26–1.65)
LAMBDA LC FREE SERPL-MCNC: 42.6 MG/L (ref 5.7–26.3)
Lab: ABNORMAL
M-SPIKE: ABNORMAL G/DL
PROT SERPL-MCNC: 7.2 G/DL (ref 6–8.5)

## 2019-05-23 ENCOUNTER — APPOINTMENT (OUTPATIENT)
Dept: LAB | Facility: HOSPITAL | Age: 74
End: 2019-05-23

## 2019-05-23 ENCOUNTER — OFFICE VISIT (OUTPATIENT)
Dept: ONCOLOGY | Facility: CLINIC | Age: 74
End: 2019-05-23

## 2019-05-23 VITALS
HEART RATE: 62 BPM | SYSTOLIC BLOOD PRESSURE: 143 MMHG | TEMPERATURE: 97.5 F | OXYGEN SATURATION: 99 % | DIASTOLIC BLOOD PRESSURE: 78 MMHG | BODY MASS INDEX: 24.94 KG/M2 | HEIGHT: 69 IN | WEIGHT: 168.4 LBS | RESPIRATION RATE: 18 BRPM

## 2019-05-23 DIAGNOSIS — D47.2 MGUS (MONOCLONAL GAMMOPATHY OF UNKNOWN SIGNIFICANCE): Primary | ICD-10-CM

## 2019-05-23 PROCEDURE — 99214 OFFICE O/P EST MOD 30 MIN: CPT | Performed by: INTERNAL MEDICINE

## 2019-05-23 PROCEDURE — G0463 HOSPITAL OUTPT CLINIC VISIT: HCPCS | Performed by: INTERNAL MEDICINE

## 2019-07-01 ENCOUNTER — DOCUMENTATION (OUTPATIENT)
Dept: ONCOLOGY | Facility: CLINIC | Age: 74
End: 2019-07-01

## 2019-07-01 NOTE — PROGRESS NOTES
Patient calls once know whether he could use a Medrol Dosepak considering his MGUS.  I advised that this should not be a problem and is, in fact, used to reduce those levels as necessary.  He is using this as a result of strain his latissimus dorsi muscle from working in the yard.

## 2019-09-23 ENCOUNTER — OFFICE VISIT (OUTPATIENT)
Dept: NEUROLOGY | Facility: CLINIC | Age: 74
End: 2019-09-23

## 2019-09-23 VITALS
HEIGHT: 69 IN | WEIGHT: 168.43 LBS | BODY MASS INDEX: 24.95 KG/M2 | SYSTOLIC BLOOD PRESSURE: 140 MMHG | HEART RATE: 61 BPM | DIASTOLIC BLOOD PRESSURE: 84 MMHG | OXYGEN SATURATION: 97 %

## 2019-09-23 DIAGNOSIS — G40.009 PARTIAL IDIOPATHIC EPILEPSY WITH SEIZURES OF LOCALIZED ONSET, NOT INTRACTABLE, WITHOUT STATUS EPILEPTICUS (HCC): Primary | ICD-10-CM

## 2019-09-23 PROCEDURE — 99213 OFFICE O/P EST LOW 20 MIN: CPT | Performed by: PSYCHIATRY & NEUROLOGY

## 2019-09-23 RX ORDER — TADALAFIL 20 MG/1
TABLET ORAL SEE ADMIN INSTRUCTIONS
Refills: 0 | COMMUNITY
Start: 2019-08-15 | End: 2019-12-05

## 2019-09-23 RX ORDER — METHYLPREDNISOLONE 4 MG/1
TABLET ORAL
Refills: 0 | COMMUNITY
Start: 2019-07-01 | End: 2019-12-05

## 2019-09-23 RX ORDER — CYCLOBENZAPRINE HCL 10 MG
10 TABLET ORAL 3 TIMES DAILY PRN
Refills: 0 | COMMUNITY
Start: 2019-07-01 | End: 2019-12-05

## 2019-09-23 NOTE — PROGRESS NOTES
Subjective:     Patient ID: Ted Morales is a 74 y.o. male.    History of Present Illness  The following portions of the patient's history were reviewed and updated as appropriate: allergies, current medications, past family history, past medical history, past social history, past surgical history and problem list.  EPILEPSY- no seizures.  Epilepsy:   I saw dr jaime  at least 30 years ago for history of a well-controlled seizure disorder while taking Dilantin and doing well.  His seizures have been generalized tonic-clonic, idiopathic.  More than 15 years ago he had a seizure while in [place].  That time his Dilantin level had fallen to less than 10 and since that time he has been seizure free and the Dilantin dose has been about 330 mg daily.       DILANTIN LAB   8/9/18  Free-1.5 total -23.  Levels will be repeated today     LOB-mild and chronic, likely AE of chronic phenytoin use.  His balance has improved with home therapy.       MGUS- last sept, after a repeat bout of angioedema, while in [place], was in ICU for pulmonary symptoms, then home. Allergy exam found MGUS with M spike. All is stable.     ROS is otherwise unremarkable.  Review of Systems   Neurological: Negative for dizziness, tremors, seizures, syncope, facial asymmetry, speech difficulty, weakness, light-headedness, numbness and headaches.   Hematological: Does not bruise/bleed easily.   Psychiatric/Behavioral: Negative for agitation, behavioral problems, confusion, decreased concentration, dysphoric mood, hallucinations, self-injury, sleep disturbance and suicidal ideas. The patient is not nervous/anxious and is not hyperactive.    All other systems reviewed and are negative.       Objective:  Mental Status   Oriented to person, place, and time.   Attention: normal. Concentration: normal.   Speech: speech is normal   Knowledge: good.   Able to name object. Able to read. Able to repeat. Able to write. Normal comprehension.      Cranial Nerves       CN II   Visual fields full to confrontation.      CN III, IV, VI   Pupils are equal, round, and reactive to light.  Extraocular motions are normal.   Right pupil: Size: 4 mm.   Left pupil: Size: 4 mm.      CN VII   Facial expression full, symmetric.      CN VIII   Hearing: intact     CN XI   CN XI normal.      CN XII   CN XII normal.      Motor Exam   Muscle bulk: normal  Overall muscle tone: normalNo  Distal atrophy      Sensory Exam   Normal vibration and pinprick at the feet and hands      Gait, Coordination, and Reflexes      Gait  Gait: normal     Coordination   Romberg: negative  Tandem walking coordination: normal.  Slight difficulty with tandem but better than last year     Tremor   Resting tremor: absent  Intention tremor: absent  Action tremor: absent     Reflexes   Right patellar: 1+  Left patellar: 1+  Right achilles: 1+  Left achilles: 1+Ankle reflexes about trace         Physical Exam   Constitutional: He is oriented to person, place, and time. He appears well-developed and well-nourished.   Eyes: Pupils are equal, round, and reactive to light. EOM are normal.   Neck: Normal range of motion. Neck supple.   Cardiovascular: Normal rate.    Pulmonary/Chest: Effort normal.   Neurological: He is oriented to person, place, and time. He has a normal Romberg Test and a normal Tandem Gait Test. Gait normal.       Psychiatric: He has a normal mood and affect. His speech is normal and behavior is normal. Judgment and thought content normal.   Nursing note and vitals reviewed.       Assessment/Plan:       Problems Addressed this Visit        Unprioritized    Partial idiopathic epilepsy with seizures of localized onset, not intractable, without status epilepticus (CMS/HCC) - Primary    Relevant Orders    Phenytoin Level, Free    Phenytoin Level, Total             In summary he has a well-controlled seizure disorder.  He will remain on brand-name phenytoin at 330 mg daily with repeat levels today.

## 2019-10-09 ENCOUNTER — TELEPHONE (OUTPATIENT)
Dept: NEUROLOGY | Facility: CLINIC | Age: 74
End: 2019-10-09

## 2019-10-09 NOTE — TELEPHONE ENCOUNTER
----- Message from Aydin Hough sent at 10/9/2019 12:16 PM EDT -----  Contact: [phone]  Patient was calling to discuss his dilantin levels with Dr. Reese. Ted can be reached at [phone]. Thanks so much.

## 2019-10-10 ENCOUNTER — TELEPHONE (OUTPATIENT)
Dept: NEUROLOGY | Facility: CLINIC | Age: 74
End: 2019-10-10

## 2019-10-10 NOTE — TELEPHONE ENCOUNTER
Ted had his Dilantin levels done through Dr. Crawford.  These revealed a Dilantin level of 30 and a free level of 1.9    I told him that the free level is in the therapeutic range but is still high enough to affect his balance in the lower level may be helpful    Therefore he will change his dose as follows    He will take Dilantin 330 mg, 5 days/week  He will take Dilantin 300 mg, 2 days/week    He will have Dr. Crawford repeat the Dilantin level and free Dilantin level in about a month.    I am hopeful that his free level will come down to about 1.5

## 2019-11-27 ENCOUNTER — LAB (OUTPATIENT)
Dept: LAB | Facility: HOSPITAL | Age: 74
End: 2019-11-27

## 2019-11-27 DIAGNOSIS — D47.2 MGUS (MONOCLONAL GAMMOPATHY OF UNKNOWN SIGNIFICANCE): ICD-10-CM

## 2019-11-27 LAB
ALBUMIN SERPL-MCNC: 4.8 G/DL (ref 3.5–5.2)
ALBUMIN/GLOB SERPL: 1.6 G/DL (ref 1.1–2.4)
ALP SERPL-CCNC: 87 U/L (ref 38–116)
ALT SERPL W P-5'-P-CCNC: 16 U/L (ref 0–41)
ANION GAP SERPL CALCULATED.3IONS-SCNC: 14.1 MMOL/L (ref 5–15)
AST SERPL-CCNC: 21 U/L (ref 0–40)
BASOPHILS # BLD AUTO: 0.07 10*3/MM3 (ref 0–0.2)
BASOPHILS NFR BLD AUTO: 1.2 % (ref 0–1.5)
BILIRUB SERPL-MCNC: 0.3 MG/DL (ref 0.2–1.2)
BUN BLD-MCNC: 31 MG/DL (ref 6–20)
BUN/CREAT SERPL: 32 (ref 7.3–30)
CALCIUM SPEC-SCNC: 9.5 MG/DL (ref 8.5–10.2)
CHLORIDE SERPL-SCNC: 99 MMOL/L (ref 98–107)
CO2 SERPL-SCNC: 26.9 MMOL/L (ref 22–29)
CREAT BLD-MCNC: 0.97 MG/DL (ref 0.7–1.3)
DEPRECATED RDW RBC AUTO: 48.8 FL (ref 37–54)
EOSINOPHIL # BLD AUTO: 0.37 10*3/MM3 (ref 0–0.4)
EOSINOPHIL NFR BLD AUTO: 6.4 % (ref 0.3–6.2)
ERYTHROCYTE [DISTWIDTH] IN BLOOD BY AUTOMATED COUNT: 13.7 % (ref 12.3–15.4)
GFR SERPL CREATININE-BSD FRML MDRD: 76 ML/MIN/1.73
GLOBULIN UR ELPH-MCNC: 3 GM/DL (ref 1.8–3.5)
GLUCOSE BLD-MCNC: 93 MG/DL (ref 74–124)
HCT VFR BLD AUTO: 38.8 % (ref 37.5–51)
HGB BLD-MCNC: 13 G/DL (ref 13–17.7)
IMM GRANULOCYTES # BLD AUTO: 0.05 10*3/MM3 (ref 0–0.05)
IMM GRANULOCYTES NFR BLD AUTO: 0.9 % (ref 0–0.5)
LYMPHOCYTES # BLD AUTO: 1.16 10*3/MM3 (ref 0.7–3.1)
LYMPHOCYTES NFR BLD AUTO: 19.9 % (ref 19.6–45.3)
MCH RBC QN AUTO: 32.3 PG (ref 26.6–33)
MCHC RBC AUTO-ENTMCNC: 33.5 G/DL (ref 31.5–35.7)
MCV RBC AUTO: 96.3 FL (ref 79–97)
MONOCYTES # BLD AUTO: 0.53 10*3/MM3 (ref 0.1–0.9)
MONOCYTES NFR BLD AUTO: 9.1 % (ref 5–12)
NEUTROPHILS # BLD AUTO: 3.64 10*3/MM3 (ref 1.7–7)
NEUTROPHILS NFR BLD AUTO: 62.5 % (ref 42.7–76)
NRBC BLD AUTO-RTO: 0 /100 WBC (ref 0–0.2)
PLATELET # BLD AUTO: 205 10*3/MM3 (ref 140–450)
PMV BLD AUTO: 8.7 FL (ref 6–12)
POTASSIUM BLD-SCNC: 4.4 MMOL/L (ref 3.5–4.7)
PROT SERPL-MCNC: 7.8 G/DL (ref 6.3–8)
RBC # BLD AUTO: 4.03 10*6/MM3 (ref 4.14–5.8)
SODIUM BLD-SCNC: 140 MMOL/L (ref 134–145)
WBC NRBC COR # BLD: 5.82 10*3/MM3 (ref 3.4–10.8)

## 2019-11-27 PROCEDURE — 85025 COMPLETE CBC W/AUTO DIFF WBC: CPT

## 2019-11-27 PROCEDURE — 80053 COMPREHEN METABOLIC PANEL: CPT

## 2019-11-27 PROCEDURE — 36415 COLL VENOUS BLD VENIPUNCTURE: CPT

## 2019-11-29 LAB
ALBUMIN SERPL-MCNC: 4.2 G/DL (ref 2.9–4.4)
ALBUMIN/GLOB SERPL: 1.4 {RATIO} (ref 0.7–1.7)
ALPHA1 GLOB FLD ELPH-MCNC: 0.3 G/DL (ref 0–0.4)
ALPHA2 GLOB SERPL ELPH-MCNC: 0.7 G/DL (ref 0.4–1)
B-GLOBULIN SERPL ELPH-MCNC: 0.9 G/DL (ref 0.7–1.3)
GAMMA GLOB SERPL ELPH-MCNC: 1.3 G/DL (ref 0.4–1.8)
GLOBULIN SER CALC-MCNC: 3.1 G/DL (ref 2.2–3.9)
IGA SERPL-MCNC: 50 MG/DL (ref 61–437)
IGG SERPL-MCNC: 772 MG/DL (ref 700–1600)
IGM SERPL-MCNC: 805 MG/DL (ref 15–143)
INTERPRETATION SERPL IEP-IMP: ABNORMAL
KAPPA LC SERPL-MCNC: 15.4 MG/L (ref 3.3–19.4)
KAPPA LC/LAMBDA SER: 0.35 {RATIO} (ref 0.26–1.65)
LAMBDA LC FREE SERPL-MCNC: 43.5 MG/L (ref 5.7–26.3)
Lab: ABNORMAL
M-SPIKE: ABNORMAL G/DL
PROT SERPL-MCNC: 7.3 G/DL (ref 6–8.5)
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
## 2019-12-03 LAB — VISC SER: 1.8 REL.SALINE (ref 1.6–1.9)

## 2019-12-05 ENCOUNTER — OFFICE VISIT (OUTPATIENT)
Dept: ONCOLOGY | Facility: CLINIC | Age: 74
End: 2019-12-05

## 2019-12-05 ENCOUNTER — APPOINTMENT (OUTPATIENT)
Dept: LAB | Facility: HOSPITAL | Age: 74
End: 2019-12-05

## 2019-12-05 VITALS
RESPIRATION RATE: 18 BRPM | BODY MASS INDEX: 25.62 KG/M2 | WEIGHT: 173 LBS | SYSTOLIC BLOOD PRESSURE: 145 MMHG | HEIGHT: 69 IN | OXYGEN SATURATION: 100 % | HEART RATE: 61 BPM | TEMPERATURE: 98.2 F | DIASTOLIC BLOOD PRESSURE: 79 MMHG

## 2019-12-05 DIAGNOSIS — D47.2 MGUS (MONOCLONAL GAMMOPATHY OF UNKNOWN SIGNIFICANCE): Primary | ICD-10-CM

## 2019-12-05 PROCEDURE — 99214 OFFICE O/P EST MOD 30 MIN: CPT | Performed by: INTERNAL MEDICINE

## 2019-12-05 PROCEDURE — G0463 HOSPITAL OUTPT CLINIC VISIT: HCPCS | Performed by: INTERNAL MEDICINE

## 2020-04-08 ENCOUNTER — OFFICE VISIT (OUTPATIENT)
Dept: ONCOLOGY | Facility: CLINIC | Age: 75
End: 2020-04-08

## 2020-04-08 DIAGNOSIS — D47.2 MGUS (MONOCLONAL GAMMOPATHY OF UNKNOWN SIGNIFICANCE): Primary | ICD-10-CM

## 2020-04-08 PROCEDURE — 99441 PR PHYS/QHP TELEPHONE EVALUATION 5-10 MIN: CPT | Performed by: INTERNAL MEDICINE

## 2020-04-08 NOTE — PROGRESS NOTES
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  Recent laboratory studies done through his primary care physician has revealed an evident elevated IgM level which is reported as monoclonal but does not appear to have been quantitated per the actual M spike.  We have discussed his past medical history in detail today and find his physical exam does not reveal evidence of lymphadenopathy or hepatosplenomegaly and that per additional laboratory studies he is not having significant anemia, hypercalcemia or renal dysfunction.  Furthermore he does not have neurologic symptoms though does have a history of seizures described above and is been on long-term Dilantin which, may itself, produce abnormalities inimmunoglobulin levels.  We have discussed IgM MGUS diagnostic criteria as consistent with an IgM M protein less than 3 g/dL, no evidence again of hypercalcemia, renal insufficiency, anemia or bone lesions and no constitutional symptoms as well as less than 10% clonal plasma cells.  As result of the above we had him proceed - assessment per CT scan of chest abdomen pelvis to determine any additional lymphadenopathy, recheck his paraprotein levels with both protein and immunoelectrophoresis, quantitative immunoglobulins, serum viscosity and free light chain analyses as well as  MARIO and sedimentation rate as baseline analysis.     Studies, reviewed above and with the patient and his wife when seen January 17 are not particularly directive of significant abnormalities at this point other than IgM monoclonal gammopathies.  It is felt this is best observed at this point unless he demonstrates hematologic worsening or physical exam findings that would suggest progression.  At a  result we planned observation rechecking him at 3 months and is next seen March 27 with no significant change in his paraprotein is, chemistries are viscosity levels.  We went on to assess him at 4 months and find approximately the same levels as he had previously.  It is felt that his elevated viscosity may be as result of hemoconcentration after vigorous workout.  We discussed this implant reassessing at 6 month intervals.  He and his wife plan to winter in [place] by December and therefore we'll plan to see him the middle that month next.    The patient is next seen December 20, 2018.  He does not demonstrate any progression of his gammopathy at this point.  He is slowly recovering from upper respiratory infection and otherwise continues on therapy including Dilantin for his seizure history which is being controlled by the drug's use.  There is at least some concern about gammopathy affecting his Dilantin efficacy and we'll continue to monitor this.  We proceed to have him tested 6 months later and is reviewed may 23rd 2019 without any significant change in his paraprotein studies are performance status. After extended discussion we went on to have him tested 6 months later and be seen December 5, 2019 without evidence of progression.  We went on to plan to retest him at 6 months which would be June 2020.    The patient calls April 8, 2020 having heard that IgM levels might be an issue.  He did not hear the entire media report but it is suspected that this will have to do with COVID 19 acute titers versus later convalescent titers.  This should not be an issue for this particular patient and we reviewed his history for approximately 6 to 7 minutes.  He will notify us he has any additional issues, currently, he is isolated in [place] at a vacation home and has no exposures.           You have chosen to receive care through a telephone visit today. Do you consent to use a telephone visit for your medical care  today? Yes     This visit has been rescheduled as a phone visit to comply with patient safety concerns in accordance with CDC recommendations. Total time of discussion was 7 minutes.    19329 is 5-10 minutes  32969 is 11-20 minutes  33478 is 21 or more minutes

## 2020-05-07 ENCOUNTER — APPOINTMENT (OUTPATIENT)
Dept: LAB | Facility: HOSPITAL | Age: 75
End: 2020-05-07

## 2020-05-14 ENCOUNTER — APPOINTMENT (OUTPATIENT)
Dept: LAB | Facility: HOSPITAL | Age: 75
End: 2020-05-14

## 2020-05-28 ENCOUNTER — LAB (OUTPATIENT)
Dept: LAB | Facility: HOSPITAL | Age: 75
End: 2020-05-28

## 2020-05-28 DIAGNOSIS — D47.2 MGUS (MONOCLONAL GAMMOPATHY OF UNKNOWN SIGNIFICANCE): ICD-10-CM

## 2020-05-28 LAB
ALBUMIN SERPL-MCNC: 4.6 G/DL (ref 3.5–5.2)
ALBUMIN/GLOB SERPL: 1.4 G/DL (ref 1.1–2.4)
ALP SERPL-CCNC: 99 U/L (ref 38–116)
ALT SERPL W P-5'-P-CCNC: 19 U/L (ref 0–41)
ANION GAP SERPL CALCULATED.3IONS-SCNC: 12.7 MMOL/L (ref 5–15)
AST SERPL-CCNC: 24 U/L (ref 0–40)
BASOPHILS # BLD AUTO: 0.06 10*3/MM3 (ref 0–0.2)
BASOPHILS NFR BLD AUTO: 1.1 % (ref 0–1.5)
BILIRUB SERPL-MCNC: 0.2 MG/DL (ref 0.2–1.2)
BUN BLD-MCNC: 31 MG/DL (ref 6–20)
BUN/CREAT SERPL: 34.4 (ref 7.3–30)
CALCIUM SPEC-SCNC: 9.6 MG/DL (ref 8.5–10.2)
CHLORIDE SERPL-SCNC: 96 MMOL/L (ref 98–107)
CO2 SERPL-SCNC: 27.3 MMOL/L (ref 22–29)
CREAT BLD-MCNC: 0.9 MG/DL (ref 0.7–1.3)
DEPRECATED RDW RBC AUTO: 48.6 FL (ref 37–54)
EOSINOPHIL # BLD AUTO: 0.28 10*3/MM3 (ref 0–0.4)
EOSINOPHIL NFR BLD AUTO: 5 % (ref 0.3–6.2)
ERYTHROCYTE [DISTWIDTH] IN BLOOD BY AUTOMATED COUNT: 13.8 % (ref 12.3–15.4)
GFR SERPL CREATININE-BSD FRML MDRD: 83 ML/MIN/1.73
GLOBULIN UR ELPH-MCNC: 3.2 GM/DL (ref 1.8–3.5)
GLUCOSE BLD-MCNC: 106 MG/DL (ref 74–124)
HCT VFR BLD AUTO: 37.7 % (ref 37.5–51)
HGB BLD-MCNC: 12.7 G/DL (ref 13–17.7)
IMM GRANULOCYTES # BLD AUTO: 0.04 10*3/MM3 (ref 0–0.05)
IMM GRANULOCYTES NFR BLD AUTO: 0.7 % (ref 0–0.5)
LYMPHOCYTES # BLD AUTO: 1.59 10*3/MM3 (ref 0.7–3.1)
LYMPHOCYTES NFR BLD AUTO: 28.1 % (ref 19.6–45.3)
MCH RBC QN AUTO: 32.2 PG (ref 26.6–33)
MCHC RBC AUTO-ENTMCNC: 33.7 G/DL (ref 31.5–35.7)
MCV RBC AUTO: 95.4 FL (ref 79–97)
MONOCYTES # BLD AUTO: 0.68 10*3/MM3 (ref 0.1–0.9)
MONOCYTES NFR BLD AUTO: 12 % (ref 5–12)
NEUTROPHILS # BLD AUTO: 3 10*3/MM3 (ref 1.7–7)
NEUTROPHILS NFR BLD AUTO: 53.1 % (ref 42.7–76)
NRBC BLD AUTO-RTO: 0 /100 WBC (ref 0–0.2)
PLATELET # BLD AUTO: 211 10*3/MM3 (ref 140–450)
PMV BLD AUTO: 8.8 FL (ref 6–12)
POTASSIUM BLD-SCNC: 4.5 MMOL/L (ref 3.5–4.7)
PROT SERPL-MCNC: 7.8 G/DL (ref 6.3–8)
RBC # BLD AUTO: 3.95 10*6/MM3 (ref 4.14–5.8)
SODIUM BLD-SCNC: 136 MMOL/L (ref 134–145)
WBC NRBC COR # BLD: 5.65 10*3/MM3 (ref 3.4–10.8)

## 2020-05-28 PROCEDURE — 36415 COLL VENOUS BLD VENIPUNCTURE: CPT

## 2020-05-28 PROCEDURE — 80053 COMPREHEN METABOLIC PANEL: CPT

## 2020-05-28 PROCEDURE — 85025 COMPLETE CBC W/AUTO DIFF WBC: CPT

## 2020-05-29 LAB
ALBUMIN SERPL-MCNC: 4.1 G/DL (ref 2.9–4.4)
ALBUMIN/GLOB SERPL: 1.3 {RATIO} (ref 0.7–1.7)
ALPHA1 GLOB FLD ELPH-MCNC: 0.2 G/DL (ref 0–0.4)
ALPHA2 GLOB SERPL ELPH-MCNC: 0.8 G/DL (ref 0.4–1)
B-GLOBULIN SERPL ELPH-MCNC: 0.9 G/DL (ref 0.7–1.3)
GAMMA GLOB SERPL ELPH-MCNC: 1.2 G/DL (ref 0.4–1.8)
GLOBULIN SER CALC-MCNC: 3.2 G/DL (ref 2.2–3.9)
IGA SERPL-MCNC: 46 MG/DL (ref 61–437)
IGG SERPL-MCNC: 726 MG/DL (ref 603–1613)
IGM SERPL-MCNC: 789 MG/DL (ref 15–143)
INTERPRETATION SERPL IEP-IMP: ABNORMAL
KAPPA LC SERPL-MCNC: 15.6 MG/L (ref 3.3–19.4)
KAPPA LC/LAMBDA SER: 0.31 {RATIO} (ref 0.26–1.65)
LAMBDA LC FREE SERPL-MCNC: 49.8 MG/L (ref 5.7–26.3)
Lab: ABNORMAL
M-SPIKE: ABNORMAL G/DL
PROT SERPL-MCNC: 7.3 G/DL (ref 6–8.5)

## 2020-06-02 LAB — VISC SER: 1.6 REL.SALINE (ref 1.6–1.9)

## 2020-06-04 ENCOUNTER — OFFICE VISIT (OUTPATIENT)
Dept: ONCOLOGY | Facility: CLINIC | Age: 75
End: 2020-06-04

## 2020-06-04 ENCOUNTER — APPOINTMENT (OUTPATIENT)
Dept: LAB | Facility: HOSPITAL | Age: 75
End: 2020-06-04

## 2020-06-04 VITALS
OXYGEN SATURATION: 100 % | RESPIRATION RATE: 16 BRPM | TEMPERATURE: 98.2 F | HEIGHT: 69 IN | BODY MASS INDEX: 25.52 KG/M2 | SYSTOLIC BLOOD PRESSURE: 155 MMHG | WEIGHT: 172.3 LBS | DIASTOLIC BLOOD PRESSURE: 82 MMHG | HEART RATE: 66 BPM

## 2020-06-04 DIAGNOSIS — D47.2 MGUS (MONOCLONAL GAMMOPATHY OF UNKNOWN SIGNIFICANCE): Primary | ICD-10-CM

## 2020-06-04 PROCEDURE — 99213 OFFICE O/P EST LOW 20 MIN: CPT | Performed by: INTERNAL MEDICINE

## 2020-06-04 RX ORDER — AMLODIPINE BESYLATE 10 MG/1
TABLET ORAL
COMMUNITY
Start: 2020-04-11 | End: 2021-11-11
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
## 2020-06-15 ENCOUNTER — TRANSCRIBE ORDERS (OUTPATIENT)
Dept: ADMINISTRATIVE | Facility: HOSPITAL | Age: 75
End: 2020-06-15

## 2020-06-15 ENCOUNTER — HOSPITAL ENCOUNTER (OUTPATIENT)
Dept: MRI IMAGING | Facility: HOSPITAL | Age: 75
Discharge: HOME | End: 2020-06-15

## 2020-06-15 ENCOUNTER — HOSPITAL ENCOUNTER (OUTPATIENT)
Dept: CT IMAGING | Facility: HOSPITAL | Age: 75
Discharge: HOME | End: 2020-06-15
Admitting: INTERNAL MEDICINE

## 2020-06-15 DIAGNOSIS — S09.8XXA BLUNT HEAD INJURY, INITIAL ENCOUNTER: ICD-10-CM

## 2020-06-15 DIAGNOSIS — R51.9 INTRACTABLE HEADACHE, UNSPECIFIED CHRONICITY PATTERN, UNSPECIFIED HEADACHE TYPE: ICD-10-CM

## 2020-06-15 DIAGNOSIS — I60.9 SUBARACHNOID HEMORRHAGE (HCC): Primary | ICD-10-CM

## 2020-06-15 DIAGNOSIS — I60.9 SUBARACHNOID HEMORRHAGE (HCC): ICD-10-CM

## 2020-06-15 DIAGNOSIS — S09.8XXA BLUNT HEAD INJURY, INITIAL ENCOUNTER: Primary | ICD-10-CM

## 2020-06-15 PROCEDURE — 70450 CT HEAD/BRAIN W/O DYE: CPT

## 2020-06-15 PROCEDURE — 70551 MRI BRAIN STEM W/O DYE: CPT

## 2020-06-23 ENCOUNTER — TRANSCRIBE ORDERS (OUTPATIENT)
Dept: ADMINISTRATIVE | Facility: HOSPITAL | Age: 75
End: 2020-06-23

## 2020-06-23 DIAGNOSIS — S09.90XA HEAD TRAUMA, INITIAL ENCOUNTER: Primary | ICD-10-CM

## 2020-07-13 ENCOUNTER — HOSPITAL ENCOUNTER (OUTPATIENT)
Dept: MRI IMAGING | Facility: HOSPITAL | Age: 75
Discharge: HOME | End: 2020-07-13
Admitting: INTERNAL MEDICINE

## 2020-07-13 DIAGNOSIS — S09.90XA HEAD TRAUMA, INITIAL ENCOUNTER: ICD-10-CM

## 2020-07-13 PROCEDURE — 70551 MRI BRAIN STEM W/O DYE: CPT

## 2020-10-02 ENCOUNTER — TELEPHONE (OUTPATIENT)
Dept: NEUROLOGY | Facility: CLINIC | Age: 75
End: 2020-10-02

## 2020-11-12 ENCOUNTER — APPOINTMENT (OUTPATIENT)
Dept: LAB | Facility: HOSPITAL | Age: 75
End: 2020-11-12

## 2020-11-12 ENCOUNTER — LAB (OUTPATIENT)
Dept: LAB | Facility: HOSPITAL | Age: 75
End: 2020-11-12

## 2020-11-12 DIAGNOSIS — D47.2 MGUS (MONOCLONAL GAMMOPATHY OF UNKNOWN SIGNIFICANCE): ICD-10-CM

## 2020-11-12 LAB
ALBUMIN SERPL-MCNC: 4.4 G/DL (ref 3.5–5.2)
ALBUMIN/GLOB SERPL: 1.8 G/DL (ref 1.1–2.4)
ALP SERPL-CCNC: 102 U/L (ref 38–116)
ALT SERPL W P-5'-P-CCNC: 19 U/L (ref 0–41)
ANION GAP SERPL CALCULATED.3IONS-SCNC: 10.2 MMOL/L (ref 5–15)
AST SERPL-CCNC: 24 U/L (ref 0–40)
BASOPHILS # BLD AUTO: 0.07 10*3/MM3 (ref 0–0.2)
BASOPHILS NFR BLD AUTO: 1.1 % (ref 0–1.5)
BILIRUB SERPL-MCNC: 0.2 MG/DL (ref 0.2–1.2)
BUN SERPL-MCNC: 22 MG/DL (ref 6–20)
BUN/CREAT SERPL: 24.4 (ref 7.3–30)
CALCIUM SPEC-SCNC: 9 MG/DL (ref 8.5–10.2)
CHLORIDE SERPL-SCNC: 97 MMOL/L (ref 98–107)
CO2 SERPL-SCNC: 27.8 MMOL/L (ref 22–29)
CREAT SERPL-MCNC: 0.9 MG/DL (ref 0.7–1.3)
DEPRECATED RDW RBC AUTO: 49.8 FL (ref 37–54)
EOSINOPHIL # BLD AUTO: 0.38 10*3/MM3 (ref 0–0.4)
EOSINOPHIL NFR BLD AUTO: 6 % (ref 0.3–6.2)
ERYTHROCYTE [DISTWIDTH] IN BLOOD BY AUTOMATED COUNT: 14.4 % (ref 12.3–15.4)
GFR SERPL CREATININE-BSD FRML MDRD: 82 ML/MIN/1.73
GLOBULIN UR ELPH-MCNC: 2.5 GM/DL (ref 1.8–3.5)
GLUCOSE SERPL-MCNC: 97 MG/DL (ref 74–124)
HCT VFR BLD AUTO: 36.4 % (ref 37.5–51)
HGB BLD-MCNC: 12.2 G/DL (ref 13–17.7)
IMM GRANULOCYTES # BLD AUTO: 0.06 10*3/MM3 (ref 0–0.05)
IMM GRANULOCYTES NFR BLD AUTO: 0.9 % (ref 0–0.5)
LYMPHOCYTES # BLD AUTO: 1.47 10*3/MM3 (ref 0.7–3.1)
LYMPHOCYTES NFR BLD AUTO: 23.1 % (ref 19.6–45.3)
MCH RBC QN AUTO: 31.8 PG (ref 26.6–33)
MCHC RBC AUTO-ENTMCNC: 33.5 G/DL (ref 31.5–35.7)
MCV RBC AUTO: 94.8 FL (ref 79–97)
MONOCYTES # BLD AUTO: 0.77 10*3/MM3 (ref 0.1–0.9)
MONOCYTES NFR BLD AUTO: 12.1 % (ref 5–12)
NEUTROPHILS NFR BLD AUTO: 3.6 10*3/MM3 (ref 1.7–7)
NEUTROPHILS NFR BLD AUTO: 56.8 % (ref 42.7–76)
NRBC BLD AUTO-RTO: 0 /100 WBC (ref 0–0.2)
PLATELET # BLD AUTO: 207 10*3/MM3 (ref 140–450)
PMV BLD AUTO: 8.3 FL (ref 6–12)
POTASSIUM SERPL-SCNC: 4.1 MMOL/L (ref 3.5–4.7)
PROT SERPL-MCNC: 6.9 G/DL (ref 6.3–8)
RBC # BLD AUTO: 3.84 10*6/MM3 (ref 4.14–5.8)
SODIUM SERPL-SCNC: 135 MMOL/L (ref 134–145)
WBC # BLD AUTO: 6.35 10*3/MM3 (ref 3.4–10.8)

## 2020-11-12 PROCEDURE — 85025 COMPLETE CBC W/AUTO DIFF WBC: CPT

## 2020-11-12 PROCEDURE — 36415 COLL VENOUS BLD VENIPUNCTURE: CPT

## 2020-11-12 PROCEDURE — 80053 COMPREHEN METABOLIC PANEL: CPT
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
## 2020-11-16 LAB
ALBUMIN SERPL ELPH-MCNC: 3.9 G/DL (ref 2.9–4.4)
ALBUMIN/GLOB SERPL: 1.4 {RATIO} (ref 0.7–1.7)
ALPHA1 GLOB SERPL ELPH-MCNC: 0.3 G/DL (ref 0–0.4)
ALPHA2 GLOB SERPL ELPH-MCNC: 0.8 G/DL (ref 0.4–1)
B-GLOBULIN SERPL ELPH-MCNC: 0.7 G/DL (ref 0.7–1.3)
GAMMA GLOB SERPL ELPH-MCNC: 1.2 G/DL (ref 0.4–1.8)
GLOBULIN SER-MCNC: 3 G/DL (ref 2.2–3.9)
IGA SERPL-MCNC: 42 MG/DL (ref 61–437)
IGG SERPL-MCNC: 684 MG/DL (ref 603–1613)
IGM SERPL-MCNC: 789 MG/DL (ref 15–143)
INTERPRETATION SERPL IEP-IMP: ABNORMAL
KAPPA LC FREE SER-MCNC: 19.2 MG/L (ref 3.3–19.4)
KAPPA LC FREE/LAMBDA FREE SER: 0.37 {RATIO} (ref 0.26–1.65)
LABORATORY COMMENT REPORT: ABNORMAL
LAMBDA LC FREE SERPL-MCNC: 52.2 MG/L (ref 5.7–26.3)
M PROTEIN SERPL ELPH-MCNC: ABNORMAL G/DL
PROT SERPL-MCNC: 6.9 G/DL (ref 6–8.5)

## 2020-11-19 ENCOUNTER — APPOINTMENT (OUTPATIENT)
Dept: LAB | Facility: HOSPITAL | Age: 75
End: 2020-11-19

## 2020-11-19 ENCOUNTER — OFFICE VISIT (OUTPATIENT)
Dept: ONCOLOGY | Facility: CLINIC | Age: 75
End: 2020-11-19

## 2020-11-19 VITALS
WEIGHT: 167.4 LBS | DIASTOLIC BLOOD PRESSURE: 81 MMHG | HEIGHT: 69 IN | OXYGEN SATURATION: 100 % | RESPIRATION RATE: 18 BRPM | BODY MASS INDEX: 24.79 KG/M2 | HEART RATE: 63 BPM | TEMPERATURE: 96.7 F | SYSTOLIC BLOOD PRESSURE: 157 MMHG

## 2020-11-19 DIAGNOSIS — D47.2 MGUS (MONOCLONAL GAMMOPATHY OF UNKNOWN SIGNIFICANCE): Primary | ICD-10-CM

## 2020-11-19 PROCEDURE — 99214 OFFICE O/P EST MOD 30 MIN: CPT | Performed by: INTERNAL MEDICINE

## 2021-03-16 ENCOUNTER — OFFICE (OUTPATIENT)
Dept: URBAN - METROPOLITAN AREA PATHOLOGY 4 | Facility: PATHOLOGY | Age: 76
End: 2021-03-16

## 2021-03-16 ENCOUNTER — AMBULATORY SURGICAL CENTER (OUTPATIENT)
Dept: URBAN - METROPOLITAN AREA SURGERY 17 | Facility: SURGERY | Age: 76
End: 2021-03-16

## 2021-03-16 VITALS
SYSTOLIC BLOOD PRESSURE: 137 MMHG | DIASTOLIC BLOOD PRESSURE: 75 MMHG | SYSTOLIC BLOOD PRESSURE: 123 MMHG | SYSTOLIC BLOOD PRESSURE: 103 MMHG | SYSTOLIC BLOOD PRESSURE: 119 MMHG | DIASTOLIC BLOOD PRESSURE: 63 MMHG | SYSTOLIC BLOOD PRESSURE: 118 MMHG | TEMPERATURE: 97.8 F | RESPIRATION RATE: 14 BRPM | HEIGHT: 70 IN | HEART RATE: 63 BPM | HEART RATE: 71 BPM | DIASTOLIC BLOOD PRESSURE: 64 MMHG | SYSTOLIC BLOOD PRESSURE: 112 MMHG | OXYGEN SATURATION: 100 % | WEIGHT: 165 LBS | DIASTOLIC BLOOD PRESSURE: 65 MMHG | HEART RATE: 66 BPM | RESPIRATION RATE: 8 BRPM | OXYGEN SATURATION: 98 % | HEART RATE: 65 BPM | DIASTOLIC BLOOD PRESSURE: 59 MMHG | RESPIRATION RATE: 12 BRPM | SYSTOLIC BLOOD PRESSURE: 116 MMHG | SYSTOLIC BLOOD PRESSURE: 153 MMHG | DIASTOLIC BLOOD PRESSURE: 69 MMHG | RESPIRATION RATE: 11 BRPM | HEART RATE: 76 BPM | HEART RATE: 69 BPM | RESPIRATION RATE: 10 BRPM | DIASTOLIC BLOOD PRESSURE: 67 MMHG | DIASTOLIC BLOOD PRESSURE: 58 MMHG | OXYGEN SATURATION: 97 % | DIASTOLIC BLOOD PRESSURE: 98 MMHG | RESPIRATION RATE: 18 BRPM | DIASTOLIC BLOOD PRESSURE: 78 MMHG | OXYGEN SATURATION: 99 % | SYSTOLIC BLOOD PRESSURE: 110 MMHG | HEART RATE: 64 BPM | DIASTOLIC BLOOD PRESSURE: 60 MMHG | SYSTOLIC BLOOD PRESSURE: 108 MMHG | SYSTOLIC BLOOD PRESSURE: 106 MMHG | DIASTOLIC BLOOD PRESSURE: 62 MMHG | TEMPERATURE: 97.5 F | HEART RATE: 74 BPM | HEART RATE: 68 BPM | DIASTOLIC BLOOD PRESSURE: 70 MMHG | HEART RATE: 62 BPM | SYSTOLIC BLOOD PRESSURE: 157 MMHG | HEART RATE: 72 BPM

## 2021-03-16 DIAGNOSIS — K64.8 OTHER HEMORRHOIDS: ICD-10-CM

## 2021-03-16 DIAGNOSIS — D12.5 BENIGN NEOPLASM OF SIGMOID COLON: ICD-10-CM

## 2021-03-16 DIAGNOSIS — R10.84 GENERALIZED ABDOMINAL PAIN: ICD-10-CM

## 2021-03-16 DIAGNOSIS — K59.00 CONSTIPATION, UNSPECIFIED: ICD-10-CM

## 2021-03-16 DIAGNOSIS — K57.30 DIVERTICULOSIS OF LARGE INTESTINE WITHOUT PERFORATION OR ABS: ICD-10-CM

## 2021-03-16 PROBLEM — K63.5 POLYP OF COLON: Status: ACTIVE | Noted: 2021-03-16

## 2021-03-16 LAB
GI HISTOLOGY: A. UNSPECIFIED: (no result)
GI HISTOLOGY: PDF REPORT: (no result)

## 2021-03-16 PROCEDURE — 45385 COLONOSCOPY W/LESION REMOVAL: CPT | Performed by: INTERNAL MEDICINE

## 2021-03-16 PROCEDURE — 88305 TISSUE EXAM BY PATHOLOGIST: CPT | Performed by: INTERNAL MEDICINE

## 2021-03-16 RX ORDER — LINACLOTIDE 72 UG/1
72 CAPSULE, GELATIN COATED ORAL
Qty: 30 | Refills: 4 | Status: ACTIVE
Start: 2021-03-16

## 2021-05-24 ENCOUNTER — CLINICAL SUPPORT (OUTPATIENT)
Dept: ONCOLOGY | Facility: HOSPITAL | Age: 76
End: 2021-05-24

## 2021-05-24 ENCOUNTER — LAB (OUTPATIENT)
Dept: LAB | Facility: HOSPITAL | Age: 76
End: 2021-05-24

## 2021-05-24 DIAGNOSIS — D47.2 MGUS (MONOCLONAL GAMMOPATHY OF UNKNOWN SIGNIFICANCE): ICD-10-CM

## 2021-05-24 LAB
ALBUMIN SERPL-MCNC: 4.5 G/DL (ref 3.5–5.2)
ALBUMIN/GLOB SERPL: 1.7 G/DL (ref 1.1–2.4)
ALP SERPL-CCNC: 84 U/L (ref 38–116)
ALT SERPL W P-5'-P-CCNC: 24 U/L (ref 0–41)
ANION GAP SERPL CALCULATED.3IONS-SCNC: 11 MMOL/L (ref 5–15)
AST SERPL-CCNC: 31 U/L (ref 0–40)
BASOPHILS # BLD AUTO: 0.07 10*3/MM3 (ref 0–0.2)
BASOPHILS NFR BLD AUTO: 1.4 % (ref 0–1.5)
BILIRUB SERPL-MCNC: 0.3 MG/DL (ref 0.2–1.2)
BUN SERPL-MCNC: 29 MG/DL (ref 6–20)
BUN/CREAT SERPL: 27.6 (ref 7.3–30)
CALCIUM SPEC-SCNC: 9.2 MG/DL (ref 8.5–10.2)
CHLORIDE SERPL-SCNC: 98 MMOL/L (ref 98–107)
CO2 SERPL-SCNC: 27 MMOL/L (ref 22–29)
CREAT SERPL-MCNC: 1.05 MG/DL (ref 0.7–1.3)
DEPRECATED RDW RBC AUTO: 47.1 FL (ref 37–54)
EOSINOPHIL # BLD AUTO: 0.19 10*3/MM3 (ref 0–0.4)
EOSINOPHIL NFR BLD AUTO: 3.7 % (ref 0.3–6.2)
ERYTHROCYTE [DISTWIDTH] IN BLOOD BY AUTOMATED COUNT: 13.8 % (ref 12.3–15.4)
GFR SERPL CREATININE-BSD FRML MDRD: 69 ML/MIN/1.73
GLOBULIN UR ELPH-MCNC: 2.7 GM/DL (ref 1.8–3.5)
GLUCOSE SERPL-MCNC: 98 MG/DL (ref 74–124)
HCT VFR BLD AUTO: 33.5 % (ref 37.5–51)
HGB BLD-MCNC: 11.8 G/DL (ref 13–17.7)
IMM GRANULOCYTES # BLD AUTO: 0.07 10*3/MM3 (ref 0–0.05)
IMM GRANULOCYTES NFR BLD AUTO: 1.4 % (ref 0–0.5)
LYMPHOCYTES # BLD AUTO: 1.23 10*3/MM3 (ref 0.7–3.1)
LYMPHOCYTES NFR BLD AUTO: 24.1 % (ref 19.6–45.3)
MCH RBC QN AUTO: 32.7 PG (ref 26.6–33)
MCHC RBC AUTO-ENTMCNC: 35.2 G/DL (ref 31.5–35.7)
MCV RBC AUTO: 92.8 FL (ref 79–97)
MONOCYTES # BLD AUTO: 0.59 10*3/MM3 (ref 0.1–0.9)
MONOCYTES NFR BLD AUTO: 11.5 % (ref 5–12)
NEUTROPHILS NFR BLD AUTO: 2.96 10*3/MM3 (ref 1.7–7)
NEUTROPHILS NFR BLD AUTO: 57.9 % (ref 42.7–76)
NRBC BLD AUTO-RTO: 0 /100 WBC (ref 0–0.2)
PLATELET # BLD AUTO: 216 10*3/MM3 (ref 140–450)
PMV BLD AUTO: 8.5 FL (ref 6–12)
POTASSIUM SERPL-SCNC: 4.3 MMOL/L (ref 3.5–4.7)
PROT SERPL-MCNC: 7.2 G/DL (ref 6.3–8)
RBC # BLD AUTO: 3.61 10*6/MM3 (ref 4.14–5.8)
SODIUM SERPL-SCNC: 136 MMOL/L (ref 134–145)
WBC # BLD AUTO: 5.11 10*3/MM3 (ref 3.4–10.8)

## 2021-05-24 PROCEDURE — G0463 HOSPITAL OUTPT CLINIC VISIT: HCPCS

## 2021-05-24 PROCEDURE — 85025 COMPLETE CBC W/AUTO DIFF WBC: CPT

## 2021-05-24 PROCEDURE — 36415 COLL VENOUS BLD VENIPUNCTURE: CPT

## 2021-05-24 PROCEDURE — 80053 COMPREHEN METABOLIC PANEL: CPT

## 2021-05-24 NOTE — NURSING NOTE
Pt is here for lab with RN review.  CBC reviewed with pt, counts are stable for this pt at this time. Pt has no complaints.  Copy of labs given to pt and f/u appt reviewed. Pt requested to be called with other pending lab results from today. Pt is instructed to call the office with any concerns or new symptoms prior to next visit. Pt vu    Lab Results   Component Value Date    WBC 5.11 05/24/2021    HGB 11.8 (L) 05/24/2021    HCT 33.5 (L) 05/24/2021    MCV 92.8 05/24/2021     05/24/2021

## 2021-05-25 ENCOUNTER — TELEPHONE (OUTPATIENT)
Dept: ONCOLOGY | Facility: CLINIC | Age: 76
End: 2021-05-25

## 2021-05-25 NOTE — TELEPHONE ENCOUNTER
REVIEWED CMP WITH PT.  BUN 29 UP FROM 6 MONTHS AGO WHICH WAS 22.  PT. STATES HE WAS VERY DEHYDRATED WHEN HE CAME IN YESTERDAY.  PT. THOUGHT THE MARY, PE, FLC SERUM WAS TO BE DONE AS WELL.  INFORMED PT. PER DR. LUI'S LAST PROGRESS NOTE DATED 11/19/21 HE STATES TO DRAW MARY,PE, FLC SERUM AND LDH IN 50 WEEKS WHICH WOULD MAKE IT ON 11/11/21 WHEN HE RETURNS TO SEE DR. LUI.  UNDERSTANDING NOTED.

## 2021-05-26 LAB — VISC SER: 1.7 REL.SALINE (ref 1.4–2.1)

## 2021-08-18 ENCOUNTER — TRANSCRIBE ORDERS (OUTPATIENT)
Dept: ADMINISTRATIVE | Facility: HOSPITAL | Age: 76
End: 2021-08-18

## 2021-08-18 DIAGNOSIS — M54.10 RADICULAR SYNDROME OF LOWER LIMBS: Primary | ICD-10-CM

## 2021-08-19 ENCOUNTER — HOSPITAL ENCOUNTER (OUTPATIENT)
Dept: MRI IMAGING | Facility: HOSPITAL | Age: 76
Discharge: HOME | End: 2021-08-19

## 2021-08-19 ENCOUNTER — HOSPITAL ENCOUNTER (OUTPATIENT)
Dept: GENERAL RADIOLOGY | Facility: HOSPITAL | Age: 76
Discharge: HOME | End: 2021-08-19

## 2021-08-19 ENCOUNTER — TRANSCRIBE ORDERS (OUTPATIENT)
Dept: ADMINISTRATIVE | Facility: HOSPITAL | Age: 76
End: 2021-08-19

## 2021-08-19 DIAGNOSIS — M51.16 RADICULOPATHY DUE TO DISORDER OF INTERVERTEBRAL DISC OF LUMBAR SPINE: Primary | ICD-10-CM

## 2021-08-19 DIAGNOSIS — M54.10 RADICULAR SYNDROME OF LOWER LIMBS: ICD-10-CM

## 2021-08-19 DIAGNOSIS — M51.16 RADICULOPATHY DUE TO DISORDER OF INTERVERTEBRAL DISC OF LUMBAR SPINE: ICD-10-CM

## 2021-08-19 PROCEDURE — 72114 X-RAY EXAM L-S SPINE BENDING: CPT

## 2021-08-19 PROCEDURE — 72148 MRI LUMBAR SPINE W/O DYE: CPT

## 2021-08-21 ENCOUNTER — APPOINTMENT (OUTPATIENT)
Dept: MRI IMAGING | Facility: HOSPITAL | Age: 76
End: 2021-08-21

## 2021-08-27 ENCOUNTER — HOSPITAL ENCOUNTER (EMERGENCY)
Facility: HOSPITAL | Age: 76
Discharge: HOME | End: 2021-08-27
Attending: EMERGENCY MEDICINE | Admitting: EMERGENCY MEDICINE

## 2021-08-27 ENCOUNTER — APPOINTMENT (OUTPATIENT)
Dept: CT IMAGING | Facility: HOSPITAL | Age: 76
End: 2021-08-27

## 2021-08-27 VITALS
TEMPERATURE: 98.3 F | DIASTOLIC BLOOD PRESSURE: 89 MMHG | OXYGEN SATURATION: 98 % | SYSTOLIC BLOOD PRESSURE: 154 MMHG | HEART RATE: 68 BPM | RESPIRATION RATE: 16 BRPM

## 2021-08-27 DIAGNOSIS — R56.9 SEIZURE (HCC): Primary | ICD-10-CM

## 2021-08-27 LAB
HOLD SPECIMEN: NORMAL
HOLD SPECIMEN: NORMAL
PHENYTOIN SERPL-MCNC: 20.2 MCG/ML (ref 10–20)
WHOLE BLOOD HOLD SPECIMEN: NORMAL
WHOLE BLOOD HOLD SPECIMEN: NORMAL

## 2021-08-27 PROCEDURE — 70450 CT HEAD/BRAIN W/O DYE: CPT

## 2021-08-27 PROCEDURE — 99283 EMERGENCY DEPT VISIT LOW MDM: CPT

## 2021-08-27 PROCEDURE — 80185 ASSAY OF PHENYTOIN TOTAL: CPT

## 2021-08-27 RX ORDER — SODIUM CHLORIDE 0.9 % (FLUSH) 0.9 %
10 SYRINGE (ML) INJECTION AS NEEDED
Status: DISCONTINUED | OUTPATIENT
Start: 2021-08-27 | End: 2021-08-27 | Stop reason: HOSPADM

## 2021-08-27 NOTE — ED NOTES
Please call Ted Morales, son with info on Ted Morales, pt. [phone]     Beba Lynn RN  08/27/21 0755
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
## 2021-08-27 NOTE — ED NOTES
Pt wearing mask. Myself had mask, and eye wear/PPE when present with pt. Hand hygiene performed before and after pt care.     Jenna Zaragoza, PCT  08/27/21 0733

## 2021-08-27 NOTE — ED PROVIDER NOTES
EMERGENCY DEPARTMENT ENCOUNTER  Patient was placed in face mask in first look and the following protective measures were taken unless additional measures were taken and documented below in the ED course. Patient was wearing facemask when I entered the room and throughout our encounter. I wore full protective equipment throughout this patient encounter including a face mask, and gloves. Hand hygiene was performed before donning protective equipment and after removal when leaving the room.    Room Number:  10/10  Date of encounter:  8/27/2021  PCP: Jensen Vieira MD    HPI:  Context: Ted Morales is a 76 y.o. male who presents to the ED c/o chief complaint of seizure.  Patient reports longstanding history of seizures, on Dilantin, followed by neurology.  Patient reports that he was having shaking in his sleep yesterday approximately 3 PM, wife woke him up from sleep, believes he had a seizure in his sleep.  Patient reports a another seizure in his sleep this morning.  Patient denies any fall or trauma, no injury occurred.  Patient reports he has been compliant with his Dilantin.  Patient reports he is under significant stress secondary to lumbar disc herniation, currently being seen by spine specialist regarding it.    MEDICAL HISTORY REVIEW  Reviewed in EPIC    PAST MEDICAL HISTORY  Active Ambulatory Problems     Diagnosis Date Noted   • Partial idiopathic epilepsy with seizures of localized onset, not intractable, without status epilepticus (CMS/Formerly [facility]) 09/22/2017   • Adverse drug effect 09/22/2017   • Loss of balance 09/22/2017   • MGUS (monoclonal gammopathy of unknown significance) 12/15/2017     Resolved Ambulatory Problems     Diagnosis Date Noted   • No Resolved Ambulatory Problems     Past Medical History:   Diagnosis Date   • H/O Angioedema 10/29/2017   • H/O Transient ischemic attack (TIA) 1954   • H/O Tuberculosis    • Heart disease    • History of foreign travel 2017   • ELIO (mycobacterium  avium-intracellulare) (CMS/Formerly [facility])    • Monoclonal gammopathy    • Seizures (CMS/Formerly [facility]) 1970   • Urethral trauma 1978       PAST SURGICAL HISTORY  Past Surgical History:   Procedure Laterality Date   • LUNG LOBECTOMY  1993    Right Lower   • TONSILLECTOMY AND ADENOIDECTOMY  1954       FAMILY HISTORY  Family History   Problem Relation Age of Onset   • Breast cancer Mother    • Heart disease Father    • Pulmonary embolism Father    • Breast cancer Sister    • Diabetes Sister    • Anemia Sister    • Lymphoma Brother        SOCIAL HISTORY  Social History     Socioeconomic History   • Marital status:      Spouse name: Yenny   • Number of children: Not on file   • Years of education: Medical school   • Highest education level: Not on file   Tobacco Use   • Smoking status: Never Smoker   • Smokeless tobacco: Never Used   Substance and Sexual Activity   • Alcohol use: Yes     Alcohol/week: 2.0 standard drinks     Types: 2 Glasses of wine per week     Comment: 3-4 times week    • Drug use: No   • Sexual activity: Defer       ALLERGIES  Levaquin [levofloxacin], Other, Cefdinir, and Rifampin    The patient's allergies have been reviewed    REVIEW OF SYSTEMS  All systems reviewed and negative except for those discussed in HPI.     PHYSICAL EXAM  I have reviewed the triage vital signs and nursing notes.  ED Triage Vitals [08/27/21 0404]   Temp Heart Rate Resp BP SpO2   98.3 °F (36.8 °C) 81 16 147/92 100 %      Temp src Heart Rate Source Patient Position BP Location FiO2 (%)   Tympanic -- -- -- --       General: No acute distress.  HENT: NCAT, PERRL, Nares patent.  Eyes: no scleral icterus.  Neck: trachea midline, no ROM limitations.  CV: regular rhythm, regular rate.  Respiratory: normal effort, CTAB.  Abdomen: soft, nondistended  : deferred.  Musculoskeletal: no deformity.  Neuro: alert and oriented, speech clear and fluent, no facial droop, moves all extremities well, sensation intact light touch all extremities, follows  commands  Skin: warm, dry.    LAB RESULTS  Recent Results (from the past 24 hour(s))   Phenytoin Level, Total    Collection Time: 08/27/21  4:35 AM    Specimen: Blood   Result Value Ref Range    Phenytoin Level 20.2 (C) 10.0 - 20.0 mcg/mL   Green Top (Gel)    Collection Time: 08/27/21  4:35 AM   Result Value Ref Range    Extra Tube Hold for add-ons.    Lavender Top    Collection Time: 08/27/21  4:35 AM   Result Value Ref Range    Extra Tube hold for add-on    Gold Top - SST    Collection Time: 08/27/21  4:36 AM   Result Value Ref Range    Extra Tube Hold for add-ons.    Light Blue Top    Collection Time: 08/27/21  4:36 AM   Result Value Ref Range    Extra Tube hold for add-on        I ordered the above labs and reviewed the results.    RADIOLOGY  CT Head Without Contrast    Result Date: 8/27/2021  CT HEAD WITHOUT CONTRAST  HISTORY: Seizure.  COMPARISON: MRI brain 07/13/2020.  FINDINGS: The brain and ventricles are symmetrical. There is no evidence of hemorrhage, hydrocephalus or of abnormal extra-axial fluid. No focal area of decreased attenuation to suggest acute infarction is identified. Areas of decreased attenuation involving the white matter of the cerebral hemispheres are noted bilaterally suggesting mild-to-moderate small vessel ischemic disease. Bone windows show no evidence of a calvarial fracture.      No evidence of fracture or hemorrhage. Atrophy, mild small vessel ischemic disease and mild-to-moderate vascular calcification are noted.    Radiation dose reduction techniques were utilized, including automated exposure control and exposure modulation based on body size.         I ordered the above noted radiological studies. I reviewed the images and results. I agree with the radiologist interpretation.    PROCEDURES  Procedures    MEDICATIONS GIVEN IN ER  Medications   sodium chloride 0.9 % flush 10 mL (has no administration in time range)       PROGRESS, DATA ANALYSIS, CONSULTS, AND MEDICAL DECISION  MAKING  A complete history and physical exam have been performed.  All available laboratory and imaging results have been reviewed by myself prior to disposition.    MDM  After the initial H&P, I discussed pertinent information from history and physical exam with patient/family.  Discussed differential diagnosis.  Discussed plan for ED evaluation/work-up/treatment.  All questions answered.  Patient/family is agreeable with plan.  ED Course as of Aug 27 0827   Fri Aug 27, 2021   0732 Medical history reviewed and significant for: Patient has a history of seizures, followed by Dr. Reese, neurology.  Last appointment appears to be in September 2019.  Patient was to follow-up and December 2020 but visit was canceled.  At last visit patient seizures were generalized tonic-clonic, idiopathic, well controlled with Dilantin.  Patient did have head trauma and June 2020, had small area of right occipital subarachnoid hemorrhage.  Confirmed with MRI.  Patient had repeat MRI performed a week later    [JG]   7063 Phone call with radiologist.  I had previously reviewed the images. I discussed the patient, imaging, and their interpretation.  CT head negative for acute pathology.  See dictated report for final interpretation.        [JG]   0823 CT head unremarkable, patient is therapeutic on his Dilantin, patient has returned to baseline and currently without complaint.  Patient is well-appearing and appears appropriate for discharge with outpatient follow-up.  Given extensive discussion return precautions, discharging with primary care and neurology follow-up.    [JG]   0824 The patient was reexamined.  They have had symptomatic improvement during their ED stay.  I discussed today's findings with the patient, explaining the pertinent positives and negatives from today's visit, and the plan of care.  Discussed plan for discharge as there is no emergent indication for admission.  Discussed limitation of the ED work-up and that this  is to rule out life-threatening emergencies but that they could require further testing as determined by their primary care and or any referred specialist patient is agreeable and understands need for follow-up and repeat exam/testing.  Patient is aware that discharge does not mean there is nothing wrong, indicates no emergency is present, and that they must continue their care with their primary care physician and/or any referred specialist.  They were given appropriate follow-up with their primary care physician and/or specialist.  I had an extensive discussion on the expected clinical course and return precautions.  Patient understands to return to the emergency department for continuation, worsening, or new symptoms.  I answered any of the patient's questions. Patient was discharged home in a stable condition.        [JG]      ED Course User Index  [JG] Timothy Yoder MD       AS OF 08:27 EDT VITALS:    BP - 154/89  HR - 81  TEMP - 98.3 °F (36.8 °C) (Tympanic)  O2 SATS - 100%    DIAGNOSIS  Final diagnoses:   Seizure (CMS/HCC)         DISPOSITION  DISCHARGE    Patient discharged in stable condition.    Reviewed implications of results, diagnosis, meds, responsibility to follow up, warning signs and symptoms of possible worsening, potential complications and reasons to return to ER.    Patient/Family voiced understanding of above instructions.    Discussed plan for discharge, as there is no emergent indication for admission. Patient referred to primary care provider for BP management due to today's BP. Pt/family is agreeable and understands need for follow up and repeat testing.  Pt is aware that discharge does not mean that nothing is wrong but it indicates no emergency is present that requires admission and they must continue care with follow-up as given below or physician of their choice.     FOLLOW-UP  Jensen Vieria MD  [address] [place], [place] [id number] [phone]    Schedule an  appointment as soon as possible for a visit in 2 days  even if well    [facility] NEUROLOG[facility]  [address]  [address] [address] [phone]  Schedule an appointment as soon as possible for a visit in 2 days  for further evaluation/management of your seizures         Medication List      No changes were made to your prescriptions during this visit.          Timothy Yoder MD  08/27/21 0886

## 2021-08-27 NOTE — ED NOTES
RN assisting with patient care in triage when patient was found in bay, very upset with delay in imaging. Pt states he had a seizure and would like a CT scan because he had an abnormal CT scan in the past. RN assured patient that we would check with provider and order CT if agreeable. Verbal order received from Fer [place] PA-C for CT Head. Charge RN, Adnre notified of situation and will speak with patient to update while RN returns to primary team.      Demarco Lopez, ALLISON  08/27/21 0618

## 2021-09-10 ENCOUNTER — APPOINTMENT (OUTPATIENT)
Dept: MRI IMAGING | Facility: HOSPITAL | Age: 76
End: 2021-09-10

## 2021-09-13 ENCOUNTER — TRANSCRIBE ORDERS (OUTPATIENT)
Dept: ADMINISTRATIVE | Facility: HOSPITAL | Age: 76
End: 2021-09-13

## 2021-09-13 DIAGNOSIS — M54.10 RADICULOPATHY, UNSPECIFIED SPINAL REGION: Primary | ICD-10-CM

## 2021-09-13 DIAGNOSIS — M54.16 LUMBAR RADICULOPATHY: ICD-10-CM

## 2021-09-14 ENCOUNTER — HOSPITAL ENCOUNTER (OUTPATIENT)
Dept: MRI IMAGING | Facility: HOSPITAL | Age: 76
Discharge: HOME | End: 2021-09-14
Admitting: INTERNAL MEDICINE

## 2021-09-14 DIAGNOSIS — M54.16 LUMBAR RADICULOPATHY: ICD-10-CM

## 2021-09-14 LAB — CREAT BLDA-MCNC: 0.9 MG/DL (ref 0.6–1.3)

## 2021-09-14 PROCEDURE — 0 GADOBENATE DIMEGLUMINE 529 MG/ML SOLUTION: Performed by: INTERNAL MEDICINE

## 2021-09-14 PROCEDURE — 72158 MRI LUMBAR SPINE W/O & W/DYE: CPT

## 2021-09-14 PROCEDURE — 82565 ASSAY OF CREATININE: CPT

## 2021-09-14 PROCEDURE — A9577 INJ MULTIHANCE: HCPCS | Performed by: INTERNAL MEDICINE

## 2021-09-14 RX ADMIN — GADOBENATE DIMEGLUMINE 15 ML: 529 INJECTION, SOLUTION INTRAVENOUS at 16:01

## 2021-10-28 ENCOUNTER — LAB (OUTPATIENT)
Dept: LAB | Facility: HOSPITAL | Age: 76
End: 2021-10-28

## 2021-10-28 DIAGNOSIS — D47.2 MGUS (MONOCLONAL GAMMOPATHY OF UNKNOWN SIGNIFICANCE): Primary | ICD-10-CM

## 2021-10-28 LAB
BASOPHILS # BLD AUTO: 0.07 10*3/MM3 (ref 0–0.2)
BASOPHILS NFR BLD AUTO: 0.7 % (ref 0–1.5)
DEPRECATED RDW RBC AUTO: 48.5 FL (ref 37–54)
EOSINOPHIL # BLD AUTO: 0.4 10*3/MM3 (ref 0–0.4)
EOSINOPHIL NFR BLD AUTO: 4.3 % (ref 0.3–6.2)
ERYTHROCYTE [DISTWIDTH] IN BLOOD BY AUTOMATED COUNT: 13.8 % (ref 12.3–15.4)
HCT VFR BLD AUTO: 38.6 % (ref 37.5–51)
HGB BLD-MCNC: 12.6 G/DL (ref 13–17.7)
IMM GRANULOCYTES # BLD AUTO: 0.08 10*3/MM3 (ref 0–0.05)
IMM GRANULOCYTES NFR BLD AUTO: 0.9 % (ref 0–0.5)
LYMPHOCYTES # BLD AUTO: 1.63 10*3/MM3 (ref 0.7–3.1)
LYMPHOCYTES NFR BLD AUTO: 17.5 % (ref 19.6–45.3)
MCH RBC QN AUTO: 31.1 PG (ref 26.6–33)
MCHC RBC AUTO-ENTMCNC: 32.6 G/DL (ref 31.5–35.7)
MCV RBC AUTO: 95.3 FL (ref 79–97)
MONOCYTES # BLD AUTO: 0.85 10*3/MM3 (ref 0.1–0.9)
MONOCYTES NFR BLD AUTO: 9.1 % (ref 5–12)
NEUTROPHILS NFR BLD AUTO: 6.31 10*3/MM3 (ref 1.7–7)
NEUTROPHILS NFR BLD AUTO: 67.5 % (ref 42.7–76)
NRBC BLD AUTO-RTO: 0 /100 WBC (ref 0–0.2)
PLATELET # BLD AUTO: 272 10*3/MM3 (ref 140–450)
PMV BLD AUTO: 8.6 FL (ref 6–12)
RBC # BLD AUTO: 4.05 10*6/MM3 (ref 4.14–5.8)
WBC # BLD AUTO: 9.34 10*3/MM3 (ref 3.4–10.8)

## 2021-10-28 PROCEDURE — 36415 COLL VENOUS BLD VENIPUNCTURE: CPT

## 2021-10-28 PROCEDURE — 85025 COMPLETE CBC W/AUTO DIFF WBC: CPT

## 2021-10-29 LAB
ALBUMIN SERPL ELPH-MCNC: 3.7 G/DL (ref 2.9–4.4)
ALBUMIN/GLOB SERPL: 1.2 {RATIO} (ref 0.7–1.7)
ALPHA1 GLOB SERPL ELPH-MCNC: 0.3 G/DL (ref 0–0.4)
ALPHA2 GLOB SERPL ELPH-MCNC: 0.8 G/DL (ref 0.4–1)
B-GLOBULIN SERPL ELPH-MCNC: 0.9 G/DL (ref 0.7–1.3)
GAMMA GLOB SERPL ELPH-MCNC: 1.3 G/DL (ref 0.4–1.8)
GLOBULIN SER-MCNC: 3.3 G/DL (ref 2.2–3.9)
IGA SERPL-MCNC: 41 MG/DL (ref 61–437)
IGG SERPL-MCNC: 673 MG/DL (ref 603–1613)
IGM SERPL-MCNC: 796 MG/DL (ref 15–143)
INTERPRETATION SERPL IEP-IMP: ABNORMAL
KAPPA LC FREE SER-MCNC: 23.4 MG/L (ref 3.3–19.4)
KAPPA LC FREE/LAMBDA FREE SER: 0.38 {RATIO} (ref 0.26–1.65)
LABORATORY COMMENT REPORT: ABNORMAL
LAMBDA LC FREE SERPL-MCNC: 62.1 MG/L (ref 5.7–26.3)
M PROTEIN SERPL ELPH-MCNC: ABNORMAL G/DL
PROT SERPL-MCNC: 7 G/DL (ref 6–8.5)
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
## 2021-11-11 ENCOUNTER — OFFICE VISIT (OUTPATIENT)
Dept: ONCOLOGY | Facility: CLINIC | Age: 76
End: 2021-11-11

## 2021-11-11 ENCOUNTER — APPOINTMENT (OUTPATIENT)
Dept: LAB | Facility: HOSPITAL | Age: 76
End: 2021-11-11

## 2021-11-11 VITALS
DIASTOLIC BLOOD PRESSURE: 87 MMHG | HEIGHT: 70 IN | WEIGHT: 170.6 LBS | HEART RATE: 68 BPM | SYSTOLIC BLOOD PRESSURE: 152 MMHG | TEMPERATURE: 97.1 F | OXYGEN SATURATION: 100 % | RESPIRATION RATE: 18 BRPM | BODY MASS INDEX: 24.42 KG/M2

## 2021-11-11 DIAGNOSIS — D47.2 MGUS (MONOCLONAL GAMMOPATHY OF UNKNOWN SIGNIFICANCE): Primary | ICD-10-CM

## 2021-11-11 PROBLEM — M79.609 PAIN IN LIMB: Status: ACTIVE | Noted: 2021-11-11

## 2021-11-11 PROBLEM — M51.36 DEGENERATION OF LUMBAR INTERVERTEBRAL DISC: Status: ACTIVE | Noted: 2021-08-24

## 2021-11-11 PROBLEM — L60.0 INGROWN NAIL: Status: ACTIVE | Noted: 2021-11-11

## 2021-11-11 PROBLEM — B07.0 VERRUCA PLANTARIS: Status: ACTIVE | Noted: 2021-11-11

## 2021-11-11 PROBLEM — M51.369 DEGENERATION OF LUMBAR INTERVERTEBRAL DISC: Status: ACTIVE | Noted: 2021-08-24

## 2021-11-11 PROBLEM — M51.16 LUMBAR DISC HERNIATION WITH RADICULOPATHY: Status: ACTIVE | Noted: 2021-08-30

## 2021-11-11 PROCEDURE — G2212 PROLONG OUTPT/OFFICE VIS: HCPCS | Performed by: INTERNAL MEDICINE

## 2021-11-11 PROCEDURE — 99215 OFFICE O/P EST HI 40 MIN: CPT | Performed by: INTERNAL MEDICINE

## 2022-05-11 ENCOUNTER — APPOINTMENT (OUTPATIENT)
Dept: LAB | Facility: HOSPITAL | Age: 77
End: 2022-05-11

## 2022-05-11 ENCOUNTER — APPOINTMENT (OUTPATIENT)
Dept: ONCOLOGY | Facility: HOSPITAL | Age: 77
End: 2022-05-11

## 2022-05-17 ENCOUNTER — CLINICAL SUPPORT (OUTPATIENT)
Dept: ONCOLOGY | Facility: HOSPITAL | Age: 77
End: 2022-05-17

## 2022-05-17 ENCOUNTER — LAB (OUTPATIENT)
Dept: LAB | Facility: HOSPITAL | Age: 77
End: 2022-05-17

## 2022-05-17 ENCOUNTER — TELEPHONE (OUTPATIENT)
Dept: ONCOLOGY | Facility: CLINIC | Age: 77
End: 2022-05-17

## 2022-05-17 DIAGNOSIS — D47.2 MGUS (MONOCLONAL GAMMOPATHY OF UNKNOWN SIGNIFICANCE): ICD-10-CM

## 2022-05-17 LAB
BASOPHILS # BLD AUTO: 0.08 10*3/MM3 (ref 0–0.2)
BASOPHILS NFR BLD AUTO: 1.2 % (ref 0–1.5)
DEPRECATED RDW RBC AUTO: 45.6 FL (ref 37–54)
EOSINOPHIL # BLD AUTO: 0.28 10*3/MM3 (ref 0–0.4)
EOSINOPHIL NFR BLD AUTO: 4.2 % (ref 0.3–6.2)
ERYTHROCYTE [DISTWIDTH] IN BLOOD BY AUTOMATED COUNT: 13.2 % (ref 12.3–15.4)
HCT VFR BLD AUTO: 37.6 % (ref 37.5–51)
HGB BLD-MCNC: 13.2 G/DL (ref 13–17.7)
IMM GRANULOCYTES # BLD AUTO: 0.07 10*3/MM3 (ref 0–0.05)
IMM GRANULOCYTES NFR BLD AUTO: 1.1 % (ref 0–0.5)
LYMPHOCYTES # BLD AUTO: 2.02 10*3/MM3 (ref 0.7–3.1)
LYMPHOCYTES NFR BLD AUTO: 30.4 % (ref 19.6–45.3)
MCH RBC QN AUTO: 32.4 PG (ref 26.6–33)
MCHC RBC AUTO-ENTMCNC: 35.1 G/DL (ref 31.5–35.7)
MCV RBC AUTO: 92.4 FL (ref 79–97)
MONOCYTES # BLD AUTO: 0.79 10*3/MM3 (ref 0.1–0.9)
MONOCYTES NFR BLD AUTO: 11.9 % (ref 5–12)
NEUTROPHILS NFR BLD AUTO: 3.41 10*3/MM3 (ref 1.7–7)
NEUTROPHILS NFR BLD AUTO: 51.2 % (ref 42.7–76)
NRBC BLD AUTO-RTO: 0 /100 WBC (ref 0–0.2)
PLATELET # BLD AUTO: 187 10*3/MM3 (ref 140–450)
PMV BLD AUTO: 9.8 FL (ref 6–12)
RBC # BLD AUTO: 4.07 10*6/MM3 (ref 4.14–5.8)
WBC NRBC COR # BLD: 6.65 10*3/MM3 (ref 3.4–10.8)

## 2022-05-17 PROCEDURE — 36415 COLL VENOUS BLD VENIPUNCTURE: CPT

## 2022-05-17 PROCEDURE — G0463 HOSPITAL OUTPT CLINIC VISIT: HCPCS

## 2022-05-17 PROCEDURE — 85025 COMPLETE CBC W/AUTO DIFF WBC: CPT

## 2022-05-17 NOTE — PROGRESS NOTES
Pt present for visit. Pt reports he is doing well and still recovering from sx last year. Counts are stable. Provided copy of labs and schedule. Pt v/u.    Lab Results   Component Value Date    WBC 6.65 05/17/2022    HGB 13.2 05/17/2022    HCT 37.6 05/17/2022    MCV 92.4 05/17/2022     05/17/2022

## 2022-05-17 NOTE — TELEPHONE ENCOUNTER
Caller: Ted Morales    Relationship to patient: Self    Best call back number: [phone]    Chief complaint:  NEEDING EARLIER APPOINTMENT TIME FOR LAB SCHEDULED 10/31    AND NEEDING HOUR LATER APPOINTMENT TIME FOR FOLLOW UP 11/07    Type of visit: LAB AND FOLLOW UP     Requested date:     11/07 FOLLOW UP NEEDING CLOSER TO 9AM OR 9:20AM APPOINTMENT TIME       10/31 LAB  NEED EARLIER TIME AROUND 8 OR 9 IF CAN DO THAT     If rescheduling, when is the original appointment: 11/07 AND 10/31    Additional notes: PLEASE CALL TED TO ADVISE IF CAN MAKE THESE APPOINTMENT CHANGES.

## 2022-08-15 ENCOUNTER — TRANSCRIBE ORDERS (OUTPATIENT)
Dept: ADMINISTRATIVE | Facility: HOSPITAL | Age: 77
End: 2022-08-15

## 2022-08-15 DIAGNOSIS — G89.18 POST-OP PAIN: Primary | ICD-10-CM

## 2022-09-08 ENCOUNTER — HOSPITAL ENCOUNTER (OUTPATIENT)
Dept: MRI IMAGING | Facility: HOSPITAL | Age: 77
Discharge: HOME | End: 2022-09-08

## 2022-09-08 ENCOUNTER — HOSPITAL ENCOUNTER (OUTPATIENT)
Dept: GENERAL RADIOLOGY | Facility: HOSPITAL | Age: 77
Discharge: HOME | End: 2022-09-08

## 2022-09-08 DIAGNOSIS — M51.9 LUMBAR DISC DISEASE: Primary | ICD-10-CM

## 2022-09-08 DIAGNOSIS — G89.18 POST-OP PAIN: ICD-10-CM

## 2022-09-08 DIAGNOSIS — M51.9 LUMBAR DISC DISEASE: ICD-10-CM

## 2022-09-08 PROCEDURE — 0 GADOBENATE DIMEGLUMINE 529 MG/ML SOLUTION: Performed by: INTERNAL MEDICINE

## 2022-09-08 PROCEDURE — 72114 X-RAY EXAM L-S SPINE BENDING: CPT

## 2022-09-08 PROCEDURE — 72158 MRI LUMBAR SPINE W/O & W/DYE: CPT

## 2022-09-08 PROCEDURE — A9577 INJ MULTIHANCE: HCPCS | Performed by: INTERNAL MEDICINE

## 2022-09-08 PROCEDURE — 82565 ASSAY OF CREATININE: CPT

## 2022-09-08 RX ADMIN — GADOBENATE DIMEGLUMINE 16 ML: 529 INJECTION, SOLUTION INTRAVENOUS at 16:54

## 2022-09-09 LAB — CREAT BLDA-MCNC: 1.2 MG/DL (ref 0.6–1.3)

## 2022-10-31 ENCOUNTER — LAB (OUTPATIENT)
Dept: LAB | Facility: HOSPITAL | Age: 77
End: 2022-10-31

## 2022-10-31 DIAGNOSIS — D47.2 MGUS (MONOCLONAL GAMMOPATHY OF UNKNOWN SIGNIFICANCE): ICD-10-CM

## 2022-10-31 LAB
ALBUMIN SERPL-MCNC: 4.4 G/DL (ref 3.5–5.2)
ALBUMIN/GLOB SERPL: 1.6 G/DL (ref 1.1–2.4)
ALP SERPL-CCNC: 120 U/L (ref 38–116)
ALT SERPL W P-5'-P-CCNC: 21 U/L (ref 0–41)
ANION GAP SERPL CALCULATED.3IONS-SCNC: 12.8 MMOL/L (ref 5–15)
AST SERPL-CCNC: 26 U/L (ref 0–40)
BASOPHILS # BLD AUTO: 0.08 10*3/MM3 (ref 0–0.2)
BASOPHILS NFR BLD AUTO: 1.2 % (ref 0–1.5)
BILIRUB SERPL-MCNC: <0.2 MG/DL (ref 0.2–1.2)
BUN SERPL-MCNC: 24 MG/DL (ref 6–20)
BUN/CREAT SERPL: 27 (ref 7.3–30)
CALCIUM SPEC-SCNC: 9 MG/DL (ref 8.5–10.2)
CHLORIDE SERPL-SCNC: 102 MMOL/L (ref 98–107)
CO2 SERPL-SCNC: 25.2 MMOL/L (ref 22–29)
CREAT SERPL-MCNC: 0.89 MG/DL (ref 0.7–1.3)
DEPRECATED RDW RBC AUTO: 49.2 FL (ref 37–54)
EGFRCR SERPLBLD CKD-EPI 2021: 88.8 ML/MIN/1.73
EOSINOPHIL # BLD AUTO: 0.35 10*3/MM3 (ref 0–0.4)
EOSINOPHIL NFR BLD AUTO: 5.1 % (ref 0.3–6.2)
ERYTHROCYTE [DISTWIDTH] IN BLOOD BY AUTOMATED COUNT: 14.2 % (ref 12.3–15.4)
GLOBULIN UR ELPH-MCNC: 2.8 GM/DL (ref 1.8–3.5)
GLUCOSE SERPL-MCNC: 107 MG/DL (ref 74–124)
HCT VFR BLD AUTO: 35.3 % (ref 37.5–51)
HGB BLD-MCNC: 12 G/DL (ref 13–17.7)
IMM GRANULOCYTES # BLD AUTO: 0.05 10*3/MM3 (ref 0–0.05)
IMM GRANULOCYTES NFR BLD AUTO: 0.7 % (ref 0–0.5)
LYMPHOCYTES # BLD AUTO: 1.42 10*3/MM3 (ref 0.7–3.1)
LYMPHOCYTES NFR BLD AUTO: 20.6 % (ref 19.6–45.3)
MCH RBC QN AUTO: 32.4 PG (ref 26.6–33)
MCHC RBC AUTO-ENTMCNC: 34 G/DL (ref 31.5–35.7)
MCV RBC AUTO: 95.4 FL (ref 79–97)
MONOCYTES # BLD AUTO: 0.71 10*3/MM3 (ref 0.1–0.9)
MONOCYTES NFR BLD AUTO: 10.3 % (ref 5–12)
NEUTROPHILS NFR BLD AUTO: 4.29 10*3/MM3 (ref 1.7–7)
NEUTROPHILS NFR BLD AUTO: 62.1 % (ref 42.7–76)
NRBC BLD AUTO-RTO: 0 /100 WBC (ref 0–0.2)
PLATELET # BLD AUTO: 199 10*3/MM3 (ref 140–450)
PMV BLD AUTO: 8.7 FL (ref 6–12)
POTASSIUM SERPL-SCNC: 4.3 MMOL/L (ref 3.5–4.7)
PROT SERPL-MCNC: 7.2 G/DL (ref 6.3–8)
RBC # BLD AUTO: 3.7 10*6/MM3 (ref 4.14–5.8)
SODIUM SERPL-SCNC: 140 MMOL/L (ref 134–145)
WBC NRBC COR # BLD: 6.9 10*3/MM3 (ref 3.4–10.8)

## 2022-10-31 PROCEDURE — 80053 COMPREHEN METABOLIC PANEL: CPT

## 2022-10-31 PROCEDURE — 36415 COLL VENOUS BLD VENIPUNCTURE: CPT

## 2022-10-31 PROCEDURE — 85025 COMPLETE CBC W/AUTO DIFF WBC: CPT

## 2022-11-01 LAB — SPECIMEN STATUS: NORMAL

## 2022-11-02 LAB
ALBUMIN SERPL ELPH-MCNC: 3.7 G/DL (ref 2.9–4.4)
ALBUMIN/GLOB SERPL: 1.3 {RATIO} (ref 0.7–1.7)
ALPHA1 GLOB SERPL ELPH-MCNC: 0.2 G/DL (ref 0–0.4)
ALPHA2 GLOB SERPL ELPH-MCNC: 0.7 G/DL (ref 0.4–1)
B-GLOBULIN SERPL ELPH-MCNC: 0.8 G/DL (ref 0.7–1.3)
GAMMA GLOB SERPL ELPH-MCNC: 1.2 G/DL (ref 0.4–1.8)
GLOBULIN SER-MCNC: 3 G/DL (ref 2.2–3.9)
IGA SERPL-MCNC: 38 MG/DL (ref 61–437)
IGG SERPL-MCNC: 613 MG/DL (ref 603–1613)
IGM SERPL-MCNC: 981 MG/DL (ref 15–143)
INTERPRETATION SERPL IEP-IMP: ABNORMAL
KAPPA LC FREE SER-MCNC: 22.3 MG/L (ref 3.3–19.4)
KAPPA LC FREE/LAMBDA FREE SER: 0.37 {RATIO} (ref 0.26–1.65)
LABORATORY COMMENT REPORT: ABNORMAL
LAMBDA LC FREE SERPL-MCNC: 60.1 MG/L (ref 5.7–26.3)
M PROTEIN SERPL ELPH-MCNC: ABNORMAL G/DL
PROT SERPL-MCNC: 6.7 G/DL (ref 6–8.5)

## 2022-11-05 LAB — VISC SER: 1.6 REL.SALINE (ref 1.4–2.1)
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
## 2022-11-09 ENCOUNTER — APPOINTMENT (OUTPATIENT)
Dept: LAB | Facility: HOSPITAL | Age: 77
End: 2022-11-09

## 2022-11-09 ENCOUNTER — OFFICE VISIT (OUTPATIENT)
Dept: ONCOLOGY | Facility: CLINIC | Age: 77
End: 2022-11-09

## 2022-11-09 VITALS
BODY MASS INDEX: 24.91 KG/M2 | HEIGHT: 70 IN | RESPIRATION RATE: 18 BRPM | DIASTOLIC BLOOD PRESSURE: 77 MMHG | OXYGEN SATURATION: 99 % | TEMPERATURE: 98 F | HEART RATE: 60 BPM | WEIGHT: 174 LBS | SYSTOLIC BLOOD PRESSURE: 133 MMHG

## 2022-11-09 DIAGNOSIS — D47.2 MGUS (MONOCLONAL GAMMOPATHY OF UNKNOWN SIGNIFICANCE): Primary | ICD-10-CM

## 2022-11-09 DIAGNOSIS — G40.009 PARTIAL IDIOPATHIC EPILEPSY WITH SEIZURES OF LOCALIZED ONSET, NOT INTRACTABLE, WITHOUT STATUS EPILEPTICUS: ICD-10-CM

## 2022-11-09 PROCEDURE — 99214 OFFICE O/P EST MOD 30 MIN: CPT | Performed by: INTERNAL MEDICINE

## 2022-11-09 RX ORDER — AMLODIPINE BESYLATE 10 MG/1
10 TABLET ORAL
COMMUNITY

## 2022-11-09 RX ORDER — DEXAMETHASONE SODIUM PHOSPHATE 1 MG/ML
SOLUTION/ DROPS OPHTHALMIC
COMMUNITY
End: 2022-12-27

## 2022-12-06 ENCOUNTER — OFFICE VISIT (OUTPATIENT)
Dept: NEUROLOGY | Facility: CLINIC | Age: 77
End: 2022-12-06

## 2022-12-06 VITALS
BODY MASS INDEX: 24.9 KG/M2 | HEART RATE: 64 BPM | DIASTOLIC BLOOD PRESSURE: 76 MMHG | HEIGHT: 70 IN | WEIGHT: 173.94 LBS | RESPIRATION RATE: 16 BRPM | SYSTOLIC BLOOD PRESSURE: 128 MMHG

## 2022-12-06 DIAGNOSIS — M85.88 OSTEOPENIA OF LUMBAR SPINE: ICD-10-CM

## 2022-12-06 DIAGNOSIS — G40.909 NONINTRACTABLE EPILEPSY WITHOUT STATUS EPILEPTICUS, UNSPECIFIED EPILEPSY TYPE: Primary | ICD-10-CM

## 2022-12-06 PROCEDURE — 99205 OFFICE O/P NEW HI 60 MIN: CPT | Performed by: STUDENT IN AN ORGANIZED HEALTH CARE EDUCATION/TRAINING PROGRAM
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
## 2022-12-09 ENCOUNTER — HOSPITAL ENCOUNTER (OUTPATIENT)
Dept: BONE DENSITY | Facility: HOSPITAL | Age: 77
Discharge: HOME | End: 2022-12-09
Admitting: STUDENT IN AN ORGANIZED HEALTH CARE EDUCATION/TRAINING PROGRAM

## 2022-12-09 DIAGNOSIS — M85.88 OSTEOPENIA OF LUMBAR SPINE: ICD-10-CM

## 2022-12-09 DIAGNOSIS — G40.909 NONINTRACTABLE EPILEPSY WITHOUT STATUS EPILEPTICUS, UNSPECIFIED EPILEPSY TYPE: ICD-10-CM

## 2022-12-09 PROCEDURE — 77080 DXA BONE DENSITY AXIAL: CPT

## 2022-12-09 NOTE — PROGRESS NOTES
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 This is great to see, as sometimes phenytoin can affect bone health.

## 2022-12-13 ENCOUNTER — TELEPHONE (OUTPATIENT)
Dept: ONCOLOGY | Facility: CLINIC | Age: 77
End: 2022-12-13

## 2022-12-13 ENCOUNTER — PATIENT ROUNDING (BHMG ONLY) (OUTPATIENT)
Dept: NEUROLOGY | Facility: CLINIC | Age: 77
End: 2022-12-13

## 2022-12-13 NOTE — TELEPHONE ENCOUNTER
Caller: Ted Morales    Relationship: Self    Best call back number: [phone]    What was the call regarding: TED CALLED FOR DR. LUI. HE SAYS HE HAS A COUPLE OF QUESTIONS REGARDING HIS LABS.    Do you require a callback: YES

## 2022-12-13 NOTE — TELEPHONE ENCOUNTER
Returned call to patient who is concerned about his lab work that was performed on 10/31/2022, he has seen Dr. Yarbrough since then, but he has been able to access his my chart results and he is asking about the IgM level that was increased to 981 at his last lab check.  He is wanting to discuss this with Dr. Yarbrough.

## 2022-12-27 ENCOUNTER — TELEPHONE (OUTPATIENT)
Dept: NEUROLOGY | Facility: CLINIC | Age: 77
End: 2022-12-27

## 2022-12-27 NOTE — TELEPHONE ENCOUNTER
Caller: DR JAYASHREE GONZALEZ     Best call back number: [phone]    What was the call regarding: WOULD LIKE TO DISCUSS RX DECISION AFTER LABS     Do you require a callback: YES TO GO OVER DECISION . YOU CAN LEAVE AD DETAILED MESS ON VM IF YOU GET BUT WOULD RATHER TALK TO .     PLEASE ADVISE.

## 2023-02-13 ENCOUNTER — TELEPHONE (OUTPATIENT)
Dept: NEUROLOGY | Facility: CLINIC | Age: 78
End: 2023-02-13
Payer: MEDICARE

## 2023-02-15 ENCOUNTER — TELEPHONE (OUTPATIENT)
Dept: NEUROLOGY | Facility: CLINIC | Age: 78
End: 2023-02-15
Payer: MEDICARE

## 2023-02-15 NOTE — TELEPHONE ENCOUNTER
----- Message from Joselito Worthington MD sent at 2/14/2023 12:10 AM EST -----  I am out of the office but I can call him next week. We have lowered his Dilantin in the interim. If he wishes, he may obtain a total phenytoin trough level from his [facility] doctor the morning after (approximately 24 hours after) taking 330 mg of Dilantin. We have discussed over the phone that he likely does not need to get a free phenytoin level done based on his previous labs.  ----- Message -----  From: Mary Jo Rodriguez MA  Sent: 2/13/2023   2:10 PM EST  To: Joselito Worthington MD    Please advise. Thank you   ----- Message -----  From: Lay Hector RegSched Rep  Sent: 2/13/2023   2:01 PM EST  To: [facility] Clinical Pool    Had to reschedule appt from March to April. Patient is concerned Dilantin levels are to too high (toxic). He would like a call back to let him know wether or not labs should be done before appointment in April. Please Advise         
SMITHAM with Dr. Salgado message below.   
No

## 2023-02-22 ENCOUNTER — TRANSCRIBE ORDERS (OUTPATIENT)
Dept: ADMINISTRATIVE | Facility: HOSPITAL | Age: 78
End: 2023-02-22
Payer: MEDICARE

## 2023-02-22 DIAGNOSIS — M79.604 RIGHT LEG PAIN: ICD-10-CM

## 2023-02-22 DIAGNOSIS — G89.18 POSTOPERATIVE PAIN: Primary | ICD-10-CM

## 2023-04-14 ENCOUNTER — TELEPHONE (OUTPATIENT)
Dept: NEUROLOGY | Facility: CLINIC | Age: 78
End: 2023-04-14

## 2023-04-14 ENCOUNTER — TELEPHONE (OUTPATIENT)
Dept: NEUROLOGY | Facility: CLINIC | Age: 78
End: 2023-04-14
Payer: MEDICARE

## 2023-04-14 NOTE — TELEPHONE ENCOUNTER
Spoke with patient and patient called and cancelled appt due to no labs being done, wife illness caused him to get behind.  Will be having labs on Monday.  Patient will call back after results and Dr. Worthington looks at results to see if he needs to come in for a visit.

## 2023-04-14 NOTE — TELEPHONE ENCOUNTER
PATIENT IS WANTING YOU TO CALL HIM PERSONALLY REGARDING HIS DILANTIN- HE IS GOING MON TO HAVE LABS DRAWN    HE IS CURRENTLY TAKING 300 MG PER DAY- HE WANTS TO KNOW IF HE CAN USE UP THE 30 MG HE HAS BY TAKING (10) PILLS PER DAY

## 2023-04-19 NOTE — TELEPHONE ENCOUNTER
PCP OFFICE FAXED PHENYTOIN LEVEL RESULTS-SCANNED INTO CHART    FREE PHENYTOIN LEVEL STILL PENDING- MIGHT BE TOMORROW PER PCP OFFICE BEFORE THEY GET THAT RESULTS BACK

## 2023-04-26 ENCOUNTER — TELEPHONE (OUTPATIENT)
Dept: ONCOLOGY | Facility: CLINIC | Age: 78
End: 2023-04-26
Payer: MEDICARE

## 2023-04-26 NOTE — TELEPHONE ENCOUNTER
Caller: Ted Morales    Relationship to patient: Self    Best call back number: [phone]    Chief complaint: R/S    Type of visit: LAB    Requested date: 1ST WEEK OF Radha     If rescheduling, when is the original appointment: 5-3

## 2023-06-07 ENCOUNTER — LAB (OUTPATIENT)
Dept: LAB | Facility: HOSPITAL | Age: 78
End: 2023-06-07
Payer: MEDICARE

## 2023-06-07 DIAGNOSIS — D47.2 MGUS (MONOCLONAL GAMMOPATHY OF UNKNOWN SIGNIFICANCE): ICD-10-CM

## 2023-06-07 LAB
ALBUMIN SERPL-MCNC: 4.6 G/DL (ref 3.5–5.2)
ALBUMIN/GLOB SERPL: 1.6 G/DL (ref 1.1–2.4)
ALP SERPL-CCNC: 80 U/L (ref 38–116)
ALT SERPL W P-5'-P-CCNC: 22 U/L (ref 0–41)
ANION GAP SERPL CALCULATED.3IONS-SCNC: 14.5 MMOL/L (ref 5–15)
AST SERPL-CCNC: 31 U/L (ref 0–40)
BASOPHILS # BLD AUTO: 0.07 10*3/MM3 (ref 0–0.2)
BASOPHILS NFR BLD AUTO: 1.2 % (ref 0–1.5)
BILIRUB SERPL-MCNC: 0.3 MG/DL (ref 0.2–1.2)
BUN SERPL-MCNC: 33 MG/DL (ref 6–20)
BUN/CREAT SERPL: 33.3 (ref 7.3–30)
CALCIUM SPEC-SCNC: 9.5 MG/DL (ref 8.5–10.2)
CHLORIDE SERPL-SCNC: 96 MMOL/L (ref 98–107)
CO2 SERPL-SCNC: 26.5 MMOL/L (ref 22–29)
CREAT SERPL-MCNC: 0.99 MG/DL (ref 0.7–1.3)
DEPRECATED RDW RBC AUTO: 46 FL (ref 37–54)
EGFRCR SERPLBLD CKD-EPI 2021: 78.9 ML/MIN/1.73
EOSINOPHIL # BLD AUTO: 0.32 10*3/MM3 (ref 0–0.4)
EOSINOPHIL NFR BLD AUTO: 5.5 % (ref 0.3–6.2)
ERYTHROCYTE [DISTWIDTH] IN BLOOD BY AUTOMATED COUNT: 13.5 % (ref 12.3–15.4)
GLOBULIN UR ELPH-MCNC: 2.9 GM/DL (ref 1.8–3.5)
GLUCOSE SERPL-MCNC: 96 MG/DL (ref 74–124)
HCT VFR BLD AUTO: 36 % (ref 37.5–51)
HGB BLD-MCNC: 12.4 G/DL (ref 13–17.7)
IMM GRANULOCYTES # BLD AUTO: 0.03 10*3/MM3 (ref 0–0.05)
IMM GRANULOCYTES NFR BLD AUTO: 0.5 % (ref 0–0.5)
LYMPHOCYTES # BLD AUTO: 1.4 10*3/MM3 (ref 0.7–3.1)
LYMPHOCYTES NFR BLD AUTO: 24.1 % (ref 19.6–45.3)
MCH RBC QN AUTO: 32.1 PG (ref 26.6–33)
MCHC RBC AUTO-ENTMCNC: 34.4 G/DL (ref 31.5–35.7)
MCV RBC AUTO: 93.3 FL (ref 79–97)
MONOCYTES # BLD AUTO: 0.66 10*3/MM3 (ref 0.1–0.9)
MONOCYTES NFR BLD AUTO: 11.4 % (ref 5–12)
NEUTROPHILS NFR BLD AUTO: 3.33 10*3/MM3 (ref 1.7–7)
NEUTROPHILS NFR BLD AUTO: 57.3 % (ref 42.7–76)
NRBC BLD AUTO-RTO: 0 /100 WBC (ref 0–0.2)
PLATELET # BLD AUTO: 208 10*3/MM3 (ref 140–450)
PMV BLD AUTO: 8.5 FL (ref 6–12)
POTASSIUM SERPL-SCNC: 4.2 MMOL/L (ref 3.5–4.7)
PROT SERPL-MCNC: 7.5 G/DL (ref 6.3–8)
RBC # BLD AUTO: 3.86 10*6/MM3 (ref 4.14–5.8)
SODIUM SERPL-SCNC: 137 MMOL/L (ref 134–145)
WBC NRBC COR # BLD: 5.81 10*3/MM3 (ref 3.4–10.8)

## 2023-06-07 PROCEDURE — 36415 COLL VENOUS BLD VENIPUNCTURE: CPT

## 2023-06-07 PROCEDURE — 85025 COMPLETE CBC W/AUTO DIFF WBC: CPT

## 2023-06-07 PROCEDURE — 80053 COMPREHEN METABOLIC PANEL: CPT

## 2023-06-09 LAB
ALBUMIN SERPL ELPH-MCNC: 4.1 G/DL (ref 2.9–4.4)
ALBUMIN/GLOB SERPL: 1.5 {RATIO} (ref 0.7–1.7)
ALPHA1 GLOB SERPL ELPH-MCNC: 0.2 G/DL (ref 0–0.4)
ALPHA2 GLOB SERPL ELPH-MCNC: 0.6 G/DL (ref 0.4–1)
B-GLOBULIN SERPL ELPH-MCNC: 0.8 G/DL (ref 0.7–1.3)
GAMMA GLOB SERPL ELPH-MCNC: 1.2 G/DL (ref 0.4–1.8)
GLOBULIN SER-MCNC: 2.9 G/DL (ref 2.2–3.9)
IGA SERPL-MCNC: 37 MG/DL (ref 61–437)
IGG SERPL-MCNC: 662 MG/DL (ref 603–1613)
IGM SERPL-MCNC: 986 MG/DL (ref 15–143)
INTERPRETATION SERPL IEP-IMP: ABNORMAL
KAPPA LC FREE SER-MCNC: 21.8 MG/L (ref 3.3–19.4)
KAPPA LC FREE/LAMBDA FREE SER: 0.37 {RATIO} (ref 0.26–1.65)
LABORATORY COMMENT REPORT: ABNORMAL
LAMBDA LC FREE SERPL-MCNC: 58.2 MG/L (ref 5.7–26.3)
M PROTEIN SERPL ELPH-MCNC: ABNORMAL G/DL
PROT SERPL-MCNC: 7 G/DL (ref 6–8.5)
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
## 2023-06-14 ENCOUNTER — OFFICE VISIT (OUTPATIENT)
Dept: ONCOLOGY | Facility: CLINIC | Age: 78
End: 2023-06-14
Payer: MEDICARE

## 2023-06-14 VITALS
DIASTOLIC BLOOD PRESSURE: 77 MMHG | HEIGHT: 70 IN | HEART RATE: 58 BPM | WEIGHT: 169.5 LBS | OXYGEN SATURATION: 98 % | BODY MASS INDEX: 24.26 KG/M2 | RESPIRATION RATE: 16 BRPM | TEMPERATURE: 98 F | SYSTOLIC BLOOD PRESSURE: 129 MMHG

## 2023-06-14 DIAGNOSIS — D47.2 MGUS (MONOCLONAL GAMMOPATHY OF UNKNOWN SIGNIFICANCE): Primary | ICD-10-CM

## 2023-06-14 PROCEDURE — 99213 OFFICE O/P EST LOW 20 MIN: CPT | Performed by: INTERNAL MEDICINE

## 2023-06-14 PROCEDURE — 1126F AMNT PAIN NOTED NONE PRSNT: CPT | Performed by: INTERNAL MEDICINE
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
## 2023-06-15 ENCOUNTER — TELEPHONE (OUTPATIENT)
Dept: NEUROLOGY | Facility: CLINIC | Age: 78
End: 2023-06-15
Payer: MEDICARE

## 2023-06-15 NOTE — TELEPHONE ENCOUNTER
Please call patient. He is very concerned about his RX and how to take it.  No RX has been given to him, so he is very concernced.  It is Phenobarbital. Thank you. Phone # [phone]

## 2023-07-31 ENCOUNTER — TELEPHONE (OUTPATIENT)
Dept: NEUROLOGY | Facility: CLINIC | Age: 78
End: 2023-07-31

## 2023-07-31 NOTE — TELEPHONE ENCOUNTER
Provider: JEAN CLAUDE REID MD    Caller: LISA PARRA    Relationship to Patient: SELF    Phone Number: [phone]    Reason for Call: PT CALLING FOR DR. REID.   CALLED S/W JONAS AT CLINIC, ADVISED PROVIDER IS STILL SEEING PATIENTS AND WILL CALL HIM BACK LATER TODAY.  ADVISED PATIENT AND HE V/U.    PLEASE CALL & ADVISE

## 2023-07-31 NOTE — TELEPHONE ENCOUNTER
Caller: Ted Morales    Relationship: Self    Best call back number: [phone]    What is the best time to reach you: ANYTIME AFTER 3 PM     Who are you requesting to speak with (clinical staff, provider,  specific staff member): DR. REID    What was the call regarding: DR. MORALES CALLED AND STATED HE IS HAVING SIDE EFFECTS FROM A MEDICATION BUT HE WILL BE GETTING ON A PLANE IN 30 MINUTES. HE ASKS IF DR. REID COULD CALL HIM AFTER 3 PM TODAY.     PLEASE REVIEW  THANK YOU

## 2023-08-02 RX ORDER — LEVETIRACETAM 750 MG/1
750 TABLET ORAL 2 TIMES DAILY
Qty: 60 TABLET | Refills: 2 | Status: SHIPPED | OUTPATIENT
Start: 2023-08-02 | End: 2023-10-31

## 2023-09-07 ENCOUNTER — TELEPHONE (OUTPATIENT)
Dept: NEUROLOGY | Facility: CLINIC | Age: 78
End: 2023-09-07
Payer: MEDICARE

## 2023-09-07 ENCOUNTER — DOCUMENTATION (OUTPATIENT)
Dept: NEUROLOGY | Facility: CLINIC | Age: 78
End: 2023-09-07
Payer: MEDICARE
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
## 2023-09-07 NOTE — TELEPHONE ENCOUNTER
"Caller: Ted Morales    Relationship: Self    Best call back number: [phone]    What was the call regarding: PT CALLED REQUESTING TO SPEAK WITH DR. REID REGARDING SOME MEDICATION CONCERNS RELATING TO AN \"EPISODE\" THAT OCCURRED APPROXIMATELY 30 MINS AGO TODAY. PT DECLINED TO PROVIDE ANY ADDITIONAL INFORMATION IN THESE REGARDS. ATTEMPTED TO WARM-TRANSFER CALL. PER JULIEN, SENDING ENCOUNTER TO BE ROUTED TO DR. REID AS PT WOULD LIKE TO SPEAK WITH DR. REID DIRECTLY.    Do you require a callback: YES, PLEASE.    Is it okay if the provider responds through [facility] [employer] [facility]?: NO    PLEASE REVIEW AND ADVISE.   "

## 2023-09-26 ENCOUNTER — TRANSCRIBE ORDERS (OUTPATIENT)
Dept: ADMINISTRATIVE | Facility: HOSPITAL | Age: 78
End: 2023-09-26
Payer: MEDICARE

## 2023-09-26 ENCOUNTER — OFFICE VISIT (OUTPATIENT)
Dept: NEUROLOGY | Facility: CLINIC | Age: 78
End: 2023-09-26
Payer: MEDICARE

## 2023-09-26 VITALS
OXYGEN SATURATION: 99 % | DIASTOLIC BLOOD PRESSURE: 62 MMHG | HEART RATE: 67 BPM | SYSTOLIC BLOOD PRESSURE: 124 MMHG | WEIGHT: 173 LBS | BODY MASS INDEX: 24.77 KG/M2 | HEIGHT: 70 IN

## 2023-09-26 DIAGNOSIS — G40.909 NONINTRACTABLE EPILEPSY WITHOUT STATUS EPILEPTICUS, UNSPECIFIED EPILEPSY TYPE: Primary | ICD-10-CM

## 2023-09-26 DIAGNOSIS — M21.372 LEFT FOOT DROP: Primary | ICD-10-CM

## 2023-09-26 PROCEDURE — 99215 OFFICE O/P EST HI 40 MIN: CPT | Performed by: STUDENT IN AN ORGANIZED HEALTH CARE EDUCATION/TRAINING PROGRAM

## 2023-09-26 PROCEDURE — 1160F RVW MEDS BY RX/DR IN RCRD: CPT | Performed by: STUDENT IN AN ORGANIZED HEALTH CARE EDUCATION/TRAINING PROGRAM

## 2023-09-26 PROCEDURE — 1159F MED LIST DOCD IN RCRD: CPT | Performed by: STUDENT IN AN ORGANIZED HEALTH CARE EDUCATION/TRAINING PROGRAM

## 2023-09-26 RX ORDER — LEVETIRACETAM 1000 MG/1
1000 TABLET ORAL NIGHTLY
COMMUNITY
Start: 2023-09-05 | End: 2023-09-26

## 2023-09-26 RX ORDER — LEVETIRACETAM 750 MG/1
750 TABLET ORAL DAILY
COMMUNITY
Start: 2023-09-05 | End: 2023-09-26

## 2023-09-26 RX ORDER — LEVETIRACETAM 1000 MG/1
1000 TABLET ORAL NIGHTLY
Qty: 90 TABLET | Refills: 3 | Status: SHIPPED | OUTPATIENT
Start: 2023-09-26

## 2023-09-26 RX ORDER — LEVETIRACETAM 750 MG/1
750 TABLET ORAL DAILY
Qty: 90 TABLET | Refills: 3 | Status: SHIPPED | OUTPATIENT
Start: 2023-09-26

## 2023-09-26 NOTE — PROGRESS NOTES
Chief Complaint   Patient presents with    Nonintractable epilepsy without status epilepticus, unspeci       Patient ID: Ted Morales is a 78 y.o. male.    HPI:    The following portions of the patient's history were reviewed and updated as appropriate: allergies, current medications, past family history, past medical history, past social history, past surgical history and problem list.    Interval history:    Review of Systems   Neurological:  Positive for weakness (left sided foot drop). Negative for dizziness, tremors, seizures, syncope, facial asymmetry, speech difficulty, light-headedness, numbness and headaches.   Psychiatric/Behavioral:  Negative for agitation, behavioral problems, confusion, decreased concentration, dysphoric mood, hallucinations, self-injury, sleep disturbance and suicidal ideas. The patient is not nervous/anxious and is not hyperactive.     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  He is a 78-year-old male with longstanding history of epilepsy previously well controlled on phenytoin monotherapy.  After multiple visits and discussion we decided to change his phenytoin to levetiracetam over concerns of phenytoin potentially affecting balance.  At his last appointment he had some fatigue on Keppra 1000 twice daily but he wanted to continue taking it.  He subsequently messaged me that he noticed some dizziness and we went down to Keppra 750 twice daily with resolution of dizziness.  He had an unusual sensation around September 7, 2023 lasting approximately 15 to 30 seconds per him and per his wife 1 to 2 minutes.  It was not his typical aura or his typical semiology of his grand mal seizures.  He reportedly was looking at her without any gaze deviation and was not speaking.  He notes having retained awareness.  I recommended he go to an intermediate dose of 750 in the morning and 1000 mg at night of the Keppra.  On review of systems he is noted to have a left-sided foot drop today.  He has an MRI  lumbar spine with and without contrast ordered scheduled for October 2, 2023.  His last lumbar spine MRI showed prior L4-L5 right foraminotomies with pronounced L2-L3 lumbar spondylosis and up to moderate foraminal narrowing at L2-L3 with L3-L4 mild to moderate canal and mild bilateral foraminal narrowing.  Keppra to 1750 mg total daily dose he had has had no recurrent episodes of feeling out of it as previously described, he has not had any dizziness which he had at the 2000 mg twice daily.  He has noticed since adjusting to 1750 twice a day but not at the 2000 mg dosage or 1500 mg total daily dosage of Keppra that he has had increased ankle and foot swelling.  It is not every day.  He does not notice any hives or signs of anaphylaxis.  He has had angioedema in the past and has not had any swelling around the mouth or lips with this.  It is isolated to his ankles and feet.  He is on amlodipine 10 mg daily and we discussed that the discontinuation of phenytoin could increase his phenytoin levels. I looked after the visit and verified that phenytoin can decrease amlodipine levels; discontinuation could have caused an increase in functional amlodipine levels and cause increased foot and ankle swelling.  He has noticed foot drop for approximately 1 month and his son has noticed that his walking is different.  He does feel like his ataxia has improved off of the phenytoin.  Vitals:    09/26/23 1524   BP: 124/62   Pulse: 67   SpO2: 99%       Neurologic Exam     Mental Status   Speech: speech is normal   Level of consciousness: alert    Cranial Nerves     CN II   Visual fields full to confrontation.     CN III, IV, VI   Pupils are equal, round, and reactive to light.  Extraocular motions are normal.     CN V   Facial sensation intact.     CN VII   Facial expression full, symmetric.     CN VIII   Hearing: intact    CN IX, X   Palate: symmetric    CN XI   Right trapezius strength: normal  Left trapezius strength:  normal    CN XII   Tongue: not atrophic  Fasciculations: absent  Tongue deviation: none  L lower facial weakness - pt notes this is chronic prior the last MRI.       Previously L shoulder lower - pt states due to lung surgery on right     Motor Exam   Muscle bulk: normal    Strength   Right deltoid: 5/5  Left deltoid: 5/5  Right biceps: 5/5  Left biceps: 5/5  Right triceps: 5/5  Left triceps: 5/5  Right iliopsoas: 5/5  Left iliopsoas: 5/5  Right quadriceps: 5/5  Left quadriceps: 5/5  Right hamstrin/5  Left hamstrin/5  Right anterior tibial: 5/5  Left anterior tibial: 4/5  Right gastroc: 5/5  Left gastroc: 5/5Grip 5 out of 5 bilaterally     Sensory Exam   Right arm light touch: normal  Left arm light touch: normal  Right leg light touch: normal  Left leg light touch: normal    Gait, Coordination, and Reflexes     Coordination   Finger to nose coordination: normal  Heel to shin coordination: normal    Reflexes   Right brachioradialis: 2+  Left brachioradialis: 2+  Right biceps: 2+  Left biceps: 1+  Right patellar: 0  Left patellar: 0  Right achilles: 0  Left achilles: 0      Physical Exam  Eyes:      Extraocular Movements: EOM normal.      Pupils: Pupils are equal, round, and reactive to light.   Neurological:      Coordination: Finger-Nose-Finger Test and Heel to Shin Test normal.      Deep Tendon Reflexes:      Reflex Scores:       Bicep reflexes are 2+ on the right side and 1+ on the left side.       Brachioradialis reflexes are 2+ on the right side and 2+ on the left side.       Patellar reflexes are 0 on the right side and 0 on the left side.       Achilles reflexes are 0 on the right side and 0 on the left side.  Psychiatric:         Speech: Speech normal.       Procedures    Assessment/Plan:    I think that the patient has for now been successfully transitioned from phenytoin to levetiracetam.  If he were to have a breakthrough event then 1 step we could take is to transition him from regular release  Keppra to 2000 mg of extended release Keppra to see if this helps.  Alternative agents include lacosamide which I have used successfully as monotherapy in my experience in the past.  He has an MRI of his lower back as previously discussed in October and he will discuss with his surgeon the findings and results.  I discussed with him that he did have left anterior tibialis weakness when I first examined him in December 2022.  At that time I recorded that he had intact reflexes in his lower extremities.  Today I had difficulty eliciting his lower extremity reflexes which could reflect a change as well as a change that he has noticed as I feel like he is very observant about his own body and medical health.  However change in reflex along with normal strength may not be grounds for a major operation which I discussed with him.  He should discuss with his surgeon the risks and benefits of surgery and whether surgery would help prevent this from getting worse or recover any acute on chronic symptoms.  We discussed seizure precautions including avoiding open flames, open bodies of water, heights greater than his own height, and operating heavy machinery.  We discussed [place] state law regarding no driving for 3 months from his last seizure.  He will follow-up in 6 months and let me know if there are any issues beforehand.     Diagnoses and all orders for this visit:    1. Nonintractable epilepsy without status epilepticus, unspecified epilepsy type (Primary)    Other orders  -     levETIRAcetam (KEPPRA) 750 MG tablet; Take 1 tablet by mouth Daily.  Dispense: 90 tablet; Refill: 3  -     levETIRAcetam (KEPPRA) 1000 MG tablet; Take 1 tablet by mouth Every Night.  Dispense: 90 tablet; Refill: 3    I spent 40 minutes caring for this patient on this date of service. This time includes time spent by me in the following activities as necessary: preparing for the visit, reviewing tests, medical records and previous visits,  obtaining and/or reviewing a separately obtained history, performing a medically appropriate exam and/or evaluation, counseling and educating the patient, and/or communicating with other healthcare professionals, documenting information in the medical record, independently interpreting results and communicating that information with the patient, and developing a medically appropriate treatment plan with consideration of other conditions, medications, and treatments.         Joselito Worthington MD

## 2023-10-02 ENCOUNTER — HOSPITAL ENCOUNTER (OUTPATIENT)
Dept: GENERAL RADIOLOGY | Facility: HOSPITAL | Age: 78
Discharge: HOME | End: 2023-10-02
Payer: MEDICARE

## 2023-10-02 ENCOUNTER — HOSPITAL ENCOUNTER (OUTPATIENT)
Dept: MRI IMAGING | Facility: HOSPITAL | Age: 78
Discharge: HOME | End: 2023-10-02
Payer: MEDICARE

## 2023-10-02 DIAGNOSIS — M21.372 LEFT FOOT DROP: ICD-10-CM

## 2023-10-02 DIAGNOSIS — M21.371 RIGHT FOOT DROP: ICD-10-CM

## 2023-10-02 PROCEDURE — 0 GADOBENATE DIMEGLUMINE 529 MG/ML SOLUTION: Performed by: INTERNAL MEDICINE

## 2023-10-02 PROCEDURE — 72114 X-RAY EXAM L-S SPINE BENDING: CPT

## 2023-10-02 PROCEDURE — A9577 INJ MULTIHANCE: HCPCS | Performed by: INTERNAL MEDICINE

## 2023-10-02 PROCEDURE — 82565 ASSAY OF CREATININE: CPT

## 2023-10-02 PROCEDURE — 72158 MRI LUMBAR SPINE W/O & W/DYE: CPT

## 2023-10-02 RX ADMIN — GADOBENATE DIMEGLUMINE 16 ML: 529 INJECTION, SOLUTION INTRAVENOUS at 19:19

## 2023-10-03 LAB — CREAT BLDA-MCNC: 1.2 MG/DL (ref 0.6–1.3)

## 2023-10-27 RX ORDER — LEVETIRACETAM 750 MG/1
750 TABLET ORAL 2 TIMES DAILY
Qty: 180 TABLET | OUTPATIENT
Start: 2023-10-27

## 2023-11-06 ENCOUNTER — TELEPHONE (OUTPATIENT)
Dept: NEUROLOGY | Facility: CLINIC | Age: 78
End: 2023-11-06
Payer: MEDICARE

## 2023-11-06 ENCOUNTER — DOCUMENTATION (OUTPATIENT)
Dept: NEUROLOGY | Facility: CLINIC | Age: 78
End: 2023-11-06
Payer: MEDICARE

## 2023-11-06 NOTE — TELEPHONE ENCOUNTER
"----- Message from Aydin Thibodeaux Rep sent at 11/6/2023 11:22 AM EST -----  Regarding: Pt call  S/w pt.  He is requesting a call back.  His meds have changed and he has some \"strange sensations\"  that he would like to discuss.  Please call pt at phone number on file.    "

## 2023-11-06 NOTE — PROGRESS NOTES
Visiting daughter in [place] last week. Felt out of sorts, 10-20 seconds moment of forgetfulness Saturday while working on the elliptical. No aura, no LOC. Saw neurosurgery recently. Had an EMG in [place] and had ankle and foot swelling. 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 Was told to do more heel toe walking.  Neurosurgeon told him he was glad that the patient is off of phenytoin. 2 events of forgetfulness in the last week.    Discussed that these events may not represent seizures as per his classic description of aura but as they were concerning to him I would like to increase the Keppra to 2000 mg extended release once nightly. Could consider repeat EEG but answers of capturing an event like this on a 1 hour or even 4-hour EEG is not highly likely. I asked him to let me know how he is doing in 1 week as he previously had dizziness on 1000 twice daily of Keppra.

## 2023-11-07 ENCOUNTER — TELEPHONE (OUTPATIENT)
Dept: ONCOLOGY | Facility: CLINIC | Age: 78
End: 2023-11-07
Payer: MEDICARE

## 2023-11-07 NOTE — TELEPHONE ENCOUNTER
Caller: Ted Morales    Relationship: Self    Best call back number: [phone]    What is the best time to reach you: ASAP    Who are you requesting to speak with (clinical staff, provider,  specific staff member): SCHEDULING    What was the call regarding: PATIENT AND SPOUSE ARE BOTH CURRENTLY SCHEDULED FOR 11/22 LAB AND 12/8 FOLLOW UP.  THEY WANT TO EITHER KEEP CURRENT LAB AND MOVE THE FOLLOW UP TO THE WEEK OF 11/29, OR TO CHANGE THE LAB TO 11/15 AND PUT THE FOLLOW UP ON 11/22.  PLEASE CALL DR. MORALES BACK TO ADVISE IF BOTH HIS APPT AND HIS WIFE'S APPT CAN BE MOVED IN THIS WAY, OR TO ADVISE.

## 2023-11-08 ENCOUNTER — TELEPHONE (OUTPATIENT)
Dept: NEUROLOGY | Facility: CLINIC | Age: 78
End: 2023-11-08
Payer: MEDICARE

## 2023-11-08 RX ORDER — LEVETIRACETAM 500 MG/1
2000 TABLET, EXTENDED RELEASE ORAL NIGHTLY
Qty: 120 TABLET | Refills: 11 | Status: SHIPPED | OUTPATIENT
Start: 2023-11-08

## 2023-11-08 NOTE — TELEPHONE ENCOUNTER
Patient called stating that he picked up his Keppra XR 1000 mg. Patient states that the medication was $1700. Patient was told by the pharmacy that he could take 4 tablets 500 mg but it would be Levetiracetam ER. Patient is wanting to know if  would be okay with that change, and if so a new script will need to sent.

## 2023-12-20 ENCOUNTER — LAB (OUTPATIENT)
Dept: LAB | Facility: HOSPITAL | Age: 78
End: 2023-12-20
Payer: MEDICARE

## 2023-12-20 DIAGNOSIS — D47.2 MGUS (MONOCLONAL GAMMOPATHY OF UNKNOWN SIGNIFICANCE): ICD-10-CM

## 2023-12-20 LAB
ALBUMIN SERPL-MCNC: 4.3 G/DL (ref 3.5–5.2)
ALBUMIN/GLOB SERPL: 1.5 G/DL
ALP SERPL-CCNC: 78 U/L (ref 39–117)
ALT SERPL W P-5'-P-CCNC: 47 U/L (ref 1–41)
ANION GAP SERPL CALCULATED.3IONS-SCNC: 9.1 MMOL/L (ref 5–15)
AST SERPL-CCNC: 44 U/L (ref 1–40)
BASOPHILS # BLD AUTO: 0.09 10*3/MM3 (ref 0–0.2)
BASOPHILS NFR BLD AUTO: 1.8 % (ref 0–1.5)
BILIRUB SERPL-MCNC: 0.3 MG/DL (ref 0–1.2)
BUN SERPL-MCNC: 23 MG/DL (ref 8–23)
BUN/CREAT SERPL: 21.1 (ref 7–25)
CALCIUM SPEC-SCNC: 9.1 MG/DL (ref 8.6–10.5)
CHLORIDE SERPL-SCNC: 106 MMOL/L (ref 98–107)
CO2 SERPL-SCNC: 27.9 MMOL/L (ref 22–29)
CREAT SERPL-MCNC: 1.09 MG/DL (ref 0.76–1.27)
DEPRECATED RDW RBC AUTO: 46.2 FL (ref 37–54)
EGFRCR SERPLBLD CKD-EPI 2021: 69.9 ML/MIN/1.73
EOSINOPHIL # BLD AUTO: 0.36 10*3/MM3 (ref 0–0.4)
EOSINOPHIL NFR BLD AUTO: 7.2 % (ref 0.3–6.2)
ERYTHROCYTE [DISTWIDTH] IN BLOOD BY AUTOMATED COUNT: 13.4 % (ref 12.3–15.4)
GLOBULIN UR ELPH-MCNC: 2.8 GM/DL
GLUCOSE SERPL-MCNC: 94 MG/DL (ref 65–99)
HCT VFR BLD AUTO: 38.2 % (ref 37.5–51)
HGB BLD-MCNC: 12.9 G/DL (ref 13–17.7)
IMM GRANULOCYTES # BLD AUTO: 0.06 10*3/MM3 (ref 0–0.05)
IMM GRANULOCYTES NFR BLD AUTO: 1.2 % (ref 0–0.5)
LDH SERPL-CCNC: 228 U/L (ref 135–225)
LYMPHOCYTES # BLD AUTO: 1.59 10*3/MM3 (ref 0.7–3.1)
LYMPHOCYTES NFR BLD AUTO: 31.9 % (ref 19.6–45.3)
MCH RBC QN AUTO: 31.4 PG (ref 26.6–33)
MCHC RBC AUTO-ENTMCNC: 33.8 G/DL (ref 31.5–35.7)
MCV RBC AUTO: 92.9 FL (ref 79–97)
MONOCYTES # BLD AUTO: 0.5 10*3/MM3 (ref 0.1–0.9)
MONOCYTES NFR BLD AUTO: 10 % (ref 5–12)
NEUTROPHILS NFR BLD AUTO: 2.39 10*3/MM3 (ref 1.7–7)
NEUTROPHILS NFR BLD AUTO: 47.9 % (ref 42.7–76)
NRBC BLD AUTO-RTO: 0 /100 WBC (ref 0–0.2)
PLATELET # BLD AUTO: 218 10*3/MM3 (ref 140–450)
PMV BLD AUTO: 8.6 FL (ref 6–12)
POTASSIUM SERPL-SCNC: 4.6 MMOL/L (ref 3.5–5.2)
PROT SERPL-MCNC: 7.1 G/DL (ref 6–8.5)
RBC # BLD AUTO: 4.11 10*6/MM3 (ref 4.14–5.8)
SODIUM SERPL-SCNC: 143 MMOL/L (ref 136–145)
WBC NRBC COR # BLD AUTO: 4.99 10*3/MM3 (ref 3.4–10.8)

## 2023-12-20 PROCEDURE — 36415 COLL VENOUS BLD VENIPUNCTURE: CPT

## 2023-12-20 PROCEDURE — 85025 COMPLETE CBC W/AUTO DIFF WBC: CPT

## 2023-12-20 PROCEDURE — 83615 LACTATE (LD) (LDH) ENZYME: CPT

## 2023-12-20 PROCEDURE — 80053 COMPREHEN METABOLIC PANEL: CPT

## 2023-12-22 LAB
ALBUMIN SERPL ELPH-MCNC: 4 G/DL (ref 2.9–4.4)
ALBUMIN/GLOB SERPL: 1.6 {RATIO} (ref 0.7–1.7)
ALPHA1 GLOB SERPL ELPH-MCNC: 0.2 G/DL (ref 0–0.4)
ALPHA2 GLOB SERPL ELPH-MCNC: 0.6 G/DL (ref 0.4–1)
B-GLOBULIN SERPL ELPH-MCNC: 0.8 G/DL (ref 0.7–1.3)
GAMMA GLOB SERPL ELPH-MCNC: 1.2 G/DL (ref 0.4–1.8)
GLOBULIN SER-MCNC: 2.6 G/DL (ref 2.2–3.9)
IGA SERPL-MCNC: 39 MG/DL (ref 61–437)
IGG SERPL-MCNC: 651 MG/DL (ref 603–1613)
IGM SERPL-MCNC: 1209 MG/DL (ref 15–143)
INTERPRETATION SERPL IEP-IMP: ABNORMAL
KAPPA LC FREE SER-MCNC: 22.6 MG/L (ref 3.3–19.4)
KAPPA LC FREE/LAMBDA FREE SER: 0.35 {RATIO} (ref 0.26–1.65)
LABORATORY COMMENT REPORT: ABNORMAL
LAMBDA LC FREE SERPL-MCNC: 63.9 MG/L (ref 5.7–26.3)
M PROTEIN SERPL ELPH-MCNC: ABNORMAL G/DL
PROT SERPL-MCNC: 6.6 G/DL (ref 6–8.5)

## 2023-12-29 LAB — VISC SER: 1.5 REL.SALINE (ref 1.4–2.1)
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
## 2024-01-02 ENCOUNTER — OFFICE VISIT (OUTPATIENT)
Dept: ONCOLOGY | Facility: CLINIC | Age: 79
End: 2024-01-02
Payer: MEDICARE

## 2024-01-02 VITALS
RESPIRATION RATE: 16 BRPM | BODY MASS INDEX: 25.78 KG/M2 | SYSTOLIC BLOOD PRESSURE: 138 MMHG | HEIGHT: 70 IN | TEMPERATURE: 97.8 F | WEIGHT: 180.1 LBS | DIASTOLIC BLOOD PRESSURE: 75 MMHG | HEART RATE: 69 BPM | OXYGEN SATURATION: 98 %

## 2024-01-02 DIAGNOSIS — D47.2 MGUS (MONOCLONAL GAMMOPATHY OF UNKNOWN SIGNIFICANCE): Primary | ICD-10-CM

## 2024-01-02 PROCEDURE — 99213 OFFICE O/P EST LOW 20 MIN: CPT | Performed by: INTERNAL MEDICINE

## 2024-01-02 PROCEDURE — 1126F AMNT PAIN NOTED NONE PRSNT: CPT | Performed by: INTERNAL MEDICINE

## 2024-02-08 NOTE — ED NOTES
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 Pt states \"I know I just need a cat scan. Isn't there a way you can tell one of the doctors my name and let them know I just need a cat scan so I can go home?\" CN notified.     Page Castaneda RN  08/27/21 0430    " Addended by: LYNN PALACIO on: 2/8/2024 12:23 PM     Modules accepted: Orders

## 2024-06-05 ENCOUNTER — LAB (OUTPATIENT)
Dept: LAB | Facility: HOSPITAL | Age: 79
End: 2024-06-05
Payer: MEDICARE

## 2024-06-05 DIAGNOSIS — D47.2 MGUS (MONOCLONAL GAMMOPATHY OF UNKNOWN SIGNIFICANCE): ICD-10-CM

## 2024-06-05 LAB
ALBUMIN SERPL-MCNC: 4.2 G/DL (ref 3.5–5.2)
ALBUMIN/GLOB SERPL: 1.8 G/DL
ALP SERPL-CCNC: 64 U/L (ref 39–117)
ALT SERPL W P-5'-P-CCNC: 42 U/L (ref 1–41)
ANION GAP SERPL CALCULATED.3IONS-SCNC: 12.2 MMOL/L (ref 5–15)
AST SERPL-CCNC: 42 U/L (ref 1–40)
BASOPHILS # BLD AUTO: 0.09 10*3/MM3 (ref 0–0.2)
BASOPHILS NFR BLD AUTO: 1.8 % (ref 0–1.5)
BILIRUB SERPL-MCNC: 0.4 MG/DL (ref 0–1.2)
BUN SERPL-MCNC: 26 MG/DL (ref 8–23)
BUN/CREAT SERPL: 28 (ref 7–25)
CALCIUM SPEC-SCNC: 9.1 MG/DL (ref 8.6–10.5)
CHLORIDE SERPL-SCNC: 101 MMOL/L (ref 98–107)
CO2 SERPL-SCNC: 25.8 MMOL/L (ref 22–29)
CREAT SERPL-MCNC: 0.93 MG/DL (ref 0.76–1.27)
DEPRECATED RDW RBC AUTO: 46.5 FL (ref 37–54)
EGFRCR SERPLBLD CKD-EPI 2021: 84.6 ML/MIN/1.73
EOSINOPHIL # BLD AUTO: 0.37 10*3/MM3 (ref 0–0.4)
EOSINOPHIL NFR BLD AUTO: 7.2 % (ref 0.3–6.2)
ERYTHROCYTE [DISTWIDTH] IN BLOOD BY AUTOMATED COUNT: 13.7 % (ref 12.3–15.4)
GLOBULIN UR ELPH-MCNC: 2.4 GM/DL
GLUCOSE SERPL-MCNC: 98 MG/DL (ref 65–99)
HCT VFR BLD AUTO: 37.8 % (ref 37.5–51)
HGB BLD-MCNC: 12.6 G/DL (ref 13–17.7)
IMM GRANULOCYTES # BLD AUTO: 0.06 10*3/MM3 (ref 0–0.05)
IMM GRANULOCYTES NFR BLD AUTO: 1.2 % (ref 0–0.5)
LDH SERPL-CCNC: 205 U/L (ref 135–225)
LYMPHOCYTES # BLD AUTO: 1.32 10*3/MM3 (ref 0.7–3.1)
LYMPHOCYTES NFR BLD AUTO: 25.8 % (ref 19.6–45.3)
MCH RBC QN AUTO: 31 PG (ref 26.6–33)
MCHC RBC AUTO-ENTMCNC: 33.3 G/DL (ref 31.5–35.7)
MCV RBC AUTO: 92.9 FL (ref 79–97)
MONOCYTES # BLD AUTO: 0.6 10*3/MM3 (ref 0.1–0.9)
MONOCYTES NFR BLD AUTO: 11.7 % (ref 5–12)
NEUTROPHILS NFR BLD AUTO: 2.68 10*3/MM3 (ref 1.7–7)
NEUTROPHILS NFR BLD AUTO: 52.3 % (ref 42.7–76)
NRBC BLD AUTO-RTO: 0 /100 WBC (ref 0–0.2)
PLATELET # BLD AUTO: 222 10*3/MM3 (ref 140–450)
PMV BLD AUTO: 8.6 FL (ref 6–12)
POTASSIUM SERPL-SCNC: 4.2 MMOL/L (ref 3.5–5.2)
PROT SERPL-MCNC: 6.6 G/DL (ref 6–8.5)
RBC # BLD AUTO: 4.07 10*6/MM3 (ref 4.14–5.8)
SODIUM SERPL-SCNC: 139 MMOL/L (ref 136–145)
WBC NRBC COR # BLD AUTO: 5.12 10*3/MM3 (ref 3.4–10.8)

## 2024-06-05 PROCEDURE — 83615 LACTATE (LD) (LDH) ENZYME: CPT

## 2024-06-05 PROCEDURE — 80053 COMPREHEN METABOLIC PANEL: CPT

## 2024-06-05 PROCEDURE — 36415 COLL VENOUS BLD VENIPUNCTURE: CPT

## 2024-06-05 PROCEDURE — 85025 COMPLETE CBC W/AUTO DIFF WBC: CPT

## 2024-06-06 LAB
ALBUMIN SERPL ELPH-MCNC: 3.7 G/DL (ref 2.9–4.4)
ALBUMIN/GLOB SERPL: 1.3 {RATIO} (ref 0.7–1.7)
ALPHA1 GLOB SERPL ELPH-MCNC: 0.2 G/DL (ref 0–0.4)
ALPHA2 GLOB SERPL ELPH-MCNC: 0.6 G/DL (ref 0.4–1)
B-GLOBULIN SERPL ELPH-MCNC: 0.8 G/DL (ref 0.7–1.3)
GAMMA GLOB SERPL ELPH-MCNC: 1.3 G/DL (ref 0.4–1.8)
GLOBULIN SER-MCNC: 3 G/DL (ref 2.2–3.9)
IGA SERPL-MCNC: 39 MG/DL (ref 61–437)
IGG SERPL-MCNC: 642 MG/DL (ref 603–1613)
IGM SERPL-MCNC: 1071 MG/DL (ref 15–143)
INTERPRETATION SERPL IEP-IMP: ABNORMAL
KAPPA LC FREE SER-MCNC: 22.8 MG/L (ref 3.3–19.4)
KAPPA LC FREE/LAMBDA FREE SER: 0.32 {RATIO} (ref 0.26–1.65)
LABORATORY COMMENT REPORT: ABNORMAL
LAMBDA LC FREE SERPL-MCNC: 71.9 MG/L (ref 5.7–26.3)
M PROTEIN SERPL ELPH-MCNC: ABNORMAL G/DL
PROT SERPL-MCNC: 6.7 G/DL (ref 6–8.5)
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
## 2024-06-19 ENCOUNTER — OFFICE VISIT (OUTPATIENT)
Dept: ONCOLOGY | Facility: CLINIC | Age: 79
End: 2024-06-19
Payer: MEDICARE

## 2024-06-19 VITALS
DIASTOLIC BLOOD PRESSURE: 70 MMHG | HEART RATE: 61 BPM | HEIGHT: 70 IN | WEIGHT: 168.4 LBS | RESPIRATION RATE: 16 BRPM | OXYGEN SATURATION: 97 % | TEMPERATURE: 97.8 F | SYSTOLIC BLOOD PRESSURE: 122 MMHG | BODY MASS INDEX: 24.11 KG/M2

## 2024-06-19 DIAGNOSIS — Z71.89 GRIEF COUNSELING: Primary | ICD-10-CM

## 2024-06-19 DIAGNOSIS — F43.21 COMPLICATED GRIEF: ICD-10-CM

## 2024-06-19 DIAGNOSIS — D47.2 MGUS (MONOCLONAL GAMMOPATHY OF UNKNOWN SIGNIFICANCE): ICD-10-CM

## 2024-06-19 RX ORDER — AMLODIPINE BESYLATE 5 MG/1
TABLET ORAL
COMMUNITY
Start: 2024-04-29
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
## 2024-06-24 ENCOUNTER — TELEPHONE (OUTPATIENT)
Dept: PSYCHIATRY | Facility: HOSPITAL | Age: 79
End: 2024-06-24
Payer: MEDICARE

## 2024-06-24 NOTE — TELEPHONE ENCOUNTER
Oncology Social Work    Referral received from Dr Yarbrough to see for grief counseling/ resources.  OSW called and spoke to patient.  Have scheduled an appt for 7/1/2024 at 9AM    LAURA Sifuentes, DOMOW

## 2024-07-01 ENCOUNTER — DOCUMENTATION (OUTPATIENT)
Dept: OTHER | Facility: HOSPITAL | Age: 79
End: 2024-07-01
Payer: MEDICARE

## 2024-07-01 NOTE — PROGRESS NOTES
Oncology Social Work  Supportive Oncology Services     Referral received from Dr. Yarbrough for grief counseling.  Chart reviewed.  Patient with [facility]- hematologically stable right now.  OSW met with patient for initial session/assessment, explained that LCSW can provide short term counseling.  Patient actively grieving and mourning the passing of his wife, Carri.  They were  for close to 53 years. Life review with patient as he shared stories of how they met, their marriage, his residency, fellowship, internship and stories from their life together.  They have 3 living children.  Patient and wife lost a set of twins ( still born) in 1979.  Discussed ways they grieved those losses.  Patient reports poor sleep and difficulty functioning due to the acute grief reaction. Patient lost his wife in April.    Encouraged patient to meet with his PCP/Internist to talk about possible medication to aid in sleep. We did discuss non-pharmacological ways to enhance sleep. Establishing a routine at night.     Discussed grief groups at [facility] or [facility] - patient not interested in group setting.      Provided active and reflective listening, validating his feelings- explained that there is no timeline for grief and to be patient with himself - explained that this is just the beginning of his grief journey and to give himself to permission to mourn as much as he needs too -  patient feels that this emotional pain and emptiness wont get any better - validated these feelings too and assured him that the pain will lessen but never fully go away.      Encouraged him to write and find creative outlets.  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   Patient used to run or swim when anxious or stressed.  Patient unable to run anymore, but possibly aquatic therapy might be helpful. Encouraged him to not self-isolate and to cont. to engage with family and friends.     Will see again on July 15th at 9AM.  Will send list of Books  that focus on grief and healing from loss.  Also provided packet of bereavement resources.      Jenna Downing, MANDAW, LCSW

## 2024-07-16 ENCOUNTER — DOCUMENTATION (OUTPATIENT)
Dept: OTHER | Facility: HOSPITAL | Age: 79
End: 2024-07-16
Payer: MEDICARE

## 2024-07-16 NOTE — PROGRESS NOTES
Oncology Social Work  Supportive Oncology Services     Met with patient today for supportive / grief therapy.  Patient reports poor sleep, fragmented sleep, increased fatigue, poor appetite, poor focus and concentration.      Encouraged pt to make an appt with his PCP regarding possible medication to aid in sleep and above symptoms.  Expressed that he has mild depression.  Patient does not endorse feeling helpless, hopeless and has not lost interest in participating in activities with friends and family.  He is still engaging.     Reviewed other ways to achieve a full night's sleep - routine and sleep schedule.    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      Patient still actively grieving and states that his mind is concentrating on the past, emotional time travel that is upsetting to him.  Normalized this for him.  Emotional support along with active and reflective listening provided.    Will see patient again on July 30th at 10AM- reminded him that sessions are time limited.  Jenna Downing, MSSW, LCSW

## 2024-07-30 ENCOUNTER — DOCUMENTATION (OUTPATIENT)
Dept: OTHER | Facility: HOSPITAL | Age: 79
End: 2024-07-30
Payer: MEDICARE

## 2024-07-30 NOTE — PROGRESS NOTES
"Oncology Social Work   Supportive Oncology Services    OSW met with patient today for continued grief therapy.  Patient reports good trip to visit friends in [place].  Continues to endorse active grief reaction, fragmented sleep.  Patient's wife birthday was in the last couple weeks - talked through how they celebrated her.  Patient tearful at times. He states that it has been 4 months since her passing and he reports that he doesn't \"really feel any better\".  LCSW explained that 4 months is not a long time in regards to grieving someone that he's known for 56 years.    Continued to encourage him to make appt with his internist in regards to sleep.     Will cont to see for support and therapy.   Jenna Downing, MSSW, LCSW   "

## 2024-09-04 ENCOUNTER — DOCUMENTATION (OUTPATIENT)
Dept: OTHER | Facility: HOSPITAL | Age: 79
End: 2024-09-04
Payer: MEDICARE

## 2024-10-22 ENCOUNTER — HOSPITAL ENCOUNTER (OUTPATIENT)
Facility: HOSPITAL | Age: 79
Discharge: HOME | End: 2024-10-22
Admitting: SURGERY
Payer: MEDICARE

## 2024-10-22 DIAGNOSIS — I65.23 BILATERAL CAROTID ARTERY STENOSIS: ICD-10-CM

## 2024-10-22 PROCEDURE — 93880 EXTRACRANIAL BILAT STUDY: CPT

## 2024-10-23 LAB
BH CV VAS STUDY COMMENTS THYROID NODULE RT 1ST MEASURE: 0.81 CM
BH CV VAS STUDY COMMENTS THYROID NODULE RT 2ND MEASUR: 1.16 CM
BH CV XLRA MEAS LEFT CAROTID BULB EDV: 26.9 CM/SEC
BH CV XLRA MEAS LEFT CAROTID BULB PSV: 74.5 CM/SEC
BH CV XLRA MEAS LEFT DIST CCA EDV: 20.8 CM/SEC
BH CV XLRA MEAS LEFT DIST CCA PSV: 85.8 CM/SEC
BH CV XLRA MEAS LEFT DIST ICA EDV: -31.2 CM/SEC
BH CV XLRA MEAS LEFT DIST ICA PSV: -90.1 CM/SEC
BH CV XLRA MEAS LEFT ICA/CCA RATIO: 1.11
BH CV XLRA MEAS LEFT MID CCA EDV: 21.7 CM/SEC
BH CV XLRA MEAS LEFT MID CCA PSV: 91.8 CM/SEC
BH CV XLRA MEAS LEFT MID ICA EDV: -29.5 CM/SEC
BH CV XLRA MEAS LEFT MID ICA PSV: -95.3 CM/SEC
BH CV XLRA MEAS LEFT PROX CCA EDV: 21.3 CM/SEC
BH CV XLRA MEAS LEFT PROX CCA PSV: 104.6 CM/SEC
BH CV XLRA MEAS LEFT PROX ECA EDV: -14.7 CM/SEC
BH CV XLRA MEAS LEFT PROX ECA PSV: -99.6 CM/SEC
BH CV XLRA MEAS LEFT PROX ICA EDV: -29.5 CM/SEC
BH CV XLRA MEAS LEFT PROX ICA PSV: -94.4 CM/SEC
BH CV XLRA MEAS LEFT PROX SCLA PSV: 142.7 CM/SEC
BH CV XLRA MEAS LEFT VERTEBRAL A EDV: 26 CM/SEC
BH CV XLRA MEAS LEFT VERTEBRAL A PSV: 76.3 CM/SEC
BH CV XLRA MEAS RIGHT CAROTID BULB EDV: 20.4 CM/SEC
BH CV XLRA MEAS RIGHT CAROTID BULB PSV: 84.7 CM/SEC
BH CV XLRA MEAS RIGHT DIST CCA EDV: 20.4 CM/SEC
BH CV XLRA MEAS RIGHT DIST CCA PSV: 83.9 CM/SEC
BH CV XLRA MEAS RIGHT DIST ICA EDV: -29.8 CM/SEC
BH CV XLRA MEAS RIGHT DIST ICA PSV: -75.3 CM/SEC
BH CV XLRA MEAS RIGHT ICA/CCA RATIO: 1.1
BH CV XLRA MEAS RIGHT MID CCA EDV: 23.2 CM/SEC
BH CV XLRA MEAS RIGHT MID CCA PSV: 98.8 CM/SEC
BH CV XLRA MEAS RIGHT MID ICA EDV: -27.4 CM/SEC
BH CV XLRA MEAS RIGHT MID ICA PSV: -83.9 CM/SEC
BH CV XLRA MEAS RIGHT PROX CCA EDV: 22.3 CM/SEC
BH CV XLRA MEAS RIGHT PROX CCA PSV: 108.5 CM/SEC
BH CV XLRA MEAS RIGHT PROX ECA EDV: -7.8 CM/SEC
BH CV XLRA MEAS RIGHT PROX ECA PSV: -92.5 CM/SEC
BH CV XLRA MEAS RIGHT PROX ICA EDV: -25.9 CM/SEC
BH CV XLRA MEAS RIGHT PROX ICA PSV: -92.5 CM/SEC
BH CV XLRA MEAS RIGHT PROX SCLA PSV: 183.8 CM/SEC
BH CV XLRA MEAS RIGHT VERTEBRAL A EDV: 15.7 CM/SEC
BH CV XLRA MEAS RIGHT VERTEBRAL A PSV: 58 CM/SEC
LEFT ARM BP: NORMAL MMHG
RIGHT ARM BP: NORMAL MMHG

## 2024-11-20 ENCOUNTER — LAB (OUTPATIENT)
Dept: LAB | Facility: HOSPITAL | Age: 79
End: 2024-11-20
Payer: MEDICARE

## 2024-11-20 DIAGNOSIS — D47.2 MGUS (MONOCLONAL GAMMOPATHY OF UNKNOWN SIGNIFICANCE): ICD-10-CM

## 2024-11-20 LAB
ALBUMIN SERPL-MCNC: 4.3 G/DL (ref 3.5–5.2)
ALBUMIN/GLOB SERPL: 1.5 G/DL
ALP SERPL-CCNC: 60 U/L (ref 39–117)
ALT SERPL W P-5'-P-CCNC: 32 U/L (ref 1–41)
ANION GAP SERPL CALCULATED.3IONS-SCNC: 7.9 MMOL/L (ref 5–15)
AST SERPL-CCNC: 33 U/L (ref 1–40)
BASOPHILS # BLD AUTO: 0.08 10*3/MM3 (ref 0–0.2)
BASOPHILS NFR BLD AUTO: 1.4 % (ref 0–1.5)
BILIRUB SERPL-MCNC: 0.4 MG/DL (ref 0–1.2)
BUN SERPL-MCNC: 22 MG/DL (ref 8–23)
BUN/CREAT SERPL: 22.9 (ref 7–25)
CALCIUM SPEC-SCNC: 9.6 MG/DL (ref 8.6–10.5)
CHLORIDE SERPL-SCNC: 97 MMOL/L (ref 98–107)
CO2 SERPL-SCNC: 30.1 MMOL/L (ref 22–29)
CREAT SERPL-MCNC: 0.96 MG/DL (ref 0.76–1.27)
DEPRECATED RDW RBC AUTO: 48 FL (ref 37–54)
EGFRCR SERPLBLD CKD-EPI 2021: 80.9 ML/MIN/1.73
EOSINOPHIL # BLD AUTO: 0.26 10*3/MM3 (ref 0–0.4)
EOSINOPHIL NFR BLD AUTO: 4.7 % (ref 0.3–6.2)
ERYTHROCYTE [DISTWIDTH] IN BLOOD BY AUTOMATED COUNT: 13.9 % (ref 12.3–15.4)
GLOBULIN UR ELPH-MCNC: 2.8 GM/DL
GLUCOSE SERPL-MCNC: 104 MG/DL (ref 65–99)
HCT VFR BLD AUTO: 38 % (ref 37.5–51)
HGB BLD-MCNC: 12.5 G/DL (ref 13–17.7)
IMM GRANULOCYTES # BLD AUTO: 0.04 10*3/MM3 (ref 0–0.05)
IMM GRANULOCYTES NFR BLD AUTO: 0.7 % (ref 0–0.5)
LYMPHOCYTES # BLD AUTO: 1.32 10*3/MM3 (ref 0.7–3.1)
LYMPHOCYTES NFR BLD AUTO: 23.7 % (ref 19.6–45.3)
MCH RBC QN AUTO: 30.8 PG (ref 26.6–33)
MCHC RBC AUTO-ENTMCNC: 32.9 G/DL (ref 31.5–35.7)
MCV RBC AUTO: 93.6 FL (ref 79–97)
MONOCYTES # BLD AUTO: 0.66 10*3/MM3 (ref 0.1–0.9)
MONOCYTES NFR BLD AUTO: 11.8 % (ref 5–12)
NEUTROPHILS NFR BLD AUTO: 3.21 10*3/MM3 (ref 1.7–7)
NEUTROPHILS NFR BLD AUTO: 57.7 % (ref 42.7–76)
NRBC BLD AUTO-RTO: 0 /100 WBC (ref 0–0.2)
PLATELET # BLD AUTO: 238 10*3/MM3 (ref 140–450)
PMV BLD AUTO: 8.7 FL (ref 6–12)
POTASSIUM SERPL-SCNC: 3.8 MMOL/L (ref 3.5–5.2)
PROT SERPL-MCNC: 7.1 G/DL (ref 6–8.5)
RBC # BLD AUTO: 4.06 10*6/MM3 (ref 4.14–5.8)
SODIUM SERPL-SCNC: 135 MMOL/L (ref 136–145)
WBC NRBC COR # BLD AUTO: 5.57 10*3/MM3 (ref 3.4–10.8)

## 2024-11-20 PROCEDURE — 85025 COMPLETE CBC W/AUTO DIFF WBC: CPT

## 2024-11-20 PROCEDURE — 36415 COLL VENOUS BLD VENIPUNCTURE: CPT

## 2024-11-20 PROCEDURE — 80053 COMPREHEN METABOLIC PANEL: CPT

## 2024-11-22 LAB
ALBUMIN SERPL ELPH-MCNC: 3.7 G/DL (ref 2.9–4.4)
ALBUMIN/GLOB SERPL: 1.2 {RATIO} (ref 0.7–1.7)
ALPHA1 GLOB SERPL ELPH-MCNC: 0.2 G/DL (ref 0–0.4)
ALPHA2 GLOB SERPL ELPH-MCNC: 0.7 G/DL (ref 0.4–1)
B-GLOBULIN SERPL ELPH-MCNC: 0.8 G/DL (ref 0.7–1.3)
GAMMA GLOB SERPL ELPH-MCNC: 1.5 G/DL (ref 0.4–1.8)
GLOBULIN SER-MCNC: 3.1 G/DL (ref 2.2–3.9)
IGA SERPL-MCNC: 37 MG/DL (ref 61–437)
IGG SERPL-MCNC: 630 MG/DL (ref 603–1613)
IGM SERPL-MCNC: 1263 MG/DL (ref 15–143)
INTERPRETATION SERPL IEP-IMP: ABNORMAL
KAPPA LC FREE SER-MCNC: 28.6 MG/L (ref 3.3–19.4)
KAPPA LC FREE/LAMBDA FREE SER: 0.36 {RATIO} (ref 0.26–1.65)
LABORATORY COMMENT REPORT: ABNORMAL
LAMBDA LC FREE SERPL-MCNC: 80.5 MG/L (ref 5.7–26.3)
M PROTEIN SERPL ELPH-MCNC: ABNORMAL G/DL
PROT SERPL-MCNC: 6.8 G/DL (ref 6–8.5)

## 2024-11-25 LAB — VISC SER: 1.7 REL.SALINE (ref 1.4–2.1)

## 2024-12-04 ENCOUNTER — OFFICE VISIT (OUTPATIENT)
Dept: ONCOLOGY | Facility: CLINIC | Age: 79
End: 2024-12-04
Payer: MEDICARE

## 2024-12-04 VITALS
BODY MASS INDEX: 24.47 KG/M2 | SYSTOLIC BLOOD PRESSURE: 131 MMHG | RESPIRATION RATE: 16 BRPM | DIASTOLIC BLOOD PRESSURE: 75 MMHG | HEIGHT: 70 IN | OXYGEN SATURATION: 97 % | HEART RATE: 62 BPM | TEMPERATURE: 97.6 F | WEIGHT: 170.9 LBS

## 2024-12-04 DIAGNOSIS — D47.2 MGUS (MONOCLONAL GAMMOPATHY OF UNKNOWN SIGNIFICANCE): Primary | ICD-10-CM

## 2024-12-04 PROCEDURE — 1126F AMNT PAIN NOTED NONE PRSNT: CPT | Performed by: INTERNAL MEDICINE

## 2024-12-04 PROCEDURE — 99213 OFFICE O/P EST LOW 20 MIN: CPT | Performed by: INTERNAL MEDICINE
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
## 2024-12-16 ENCOUNTER — TELEPHONE (OUTPATIENT)
Dept: ONCOLOGY | Facility: CLINIC | Age: 79
End: 2024-12-16
Payer: MEDICARE

## 2024-12-16 NOTE — TELEPHONE ENCOUNTER
Provider: Zenon  Caller: patient  Relationship to Patient: self  Call Back Phone Number: [phone]  Reason for Call: he has a question?

## 2024-12-16 NOTE — TELEPHONE ENCOUNTER
Called patient, said he needs to speak with Dr. Yarbrough, he knows him as a friend. Said that he found out his daughter in law has DCIS and wanted to speak with him regarding this.

## 2025-05-13 ENCOUNTER — TRANSCRIBE ORDERS (OUTPATIENT)
Dept: ADMINISTRATIVE | Facility: HOSPITAL | Age: 80
End: 2025-05-13
Payer: MEDICARE

## 2025-05-13 DIAGNOSIS — M54.10 BACK PAIN WITH RIGHT-SIDED RADICULOPATHY: Primary | ICD-10-CM

## 2025-05-14 ENCOUNTER — HOSPITAL ENCOUNTER (OUTPATIENT)
Dept: MRI IMAGING | Facility: HOSPITAL | Age: 80
Discharge: HOME | End: 2025-05-14
Admitting: INTERNAL MEDICINE
Payer: MEDICARE

## 2025-05-14 DIAGNOSIS — M54.10 BACK PAIN WITH RIGHT-SIDED RADICULOPATHY: ICD-10-CM

## 2025-05-14 PROCEDURE — 25510000002 GADOBENATE DIMEGLUMINE 529 MG/ML SOLUTION: Performed by: INTERNAL MEDICINE

## 2025-05-14 PROCEDURE — A9577 INJ MULTIHANCE: HCPCS | Performed by: INTERNAL MEDICINE

## 2025-05-14 PROCEDURE — 72158 MRI LUMBAR SPINE W/O & W/DYE: CPT

## 2025-05-14 RX ADMIN — GADOBENATE DIMEGLUMINE 15 ML: 529 INJECTION, SOLUTION INTRAVENOUS at 14:22

## 2025-05-20 ENCOUNTER — LAB (OUTPATIENT)
Dept: LAB | Facility: HOSPITAL | Age: 80
End: 2025-05-20
Payer: MEDICARE

## 2025-05-20 DIAGNOSIS — D47.2 MGUS (MONOCLONAL GAMMOPATHY OF UNKNOWN SIGNIFICANCE): ICD-10-CM

## 2025-05-20 LAB
ALBUMIN SERPL-MCNC: 4.3 G/DL (ref 3.5–5.2)
ALBUMIN/GLOB SERPL: 1.7 G/DL
ALP SERPL-CCNC: 61 U/L (ref 39–117)
ALT SERPL W P-5'-P-CCNC: 33 U/L (ref 1–41)
ANION GAP SERPL CALCULATED.3IONS-SCNC: 11.4 MMOL/L (ref 5–15)
AST SERPL-CCNC: 27 U/L (ref 1–40)
BASOPHILS # BLD AUTO: 0.06 10*3/MM3 (ref 0–0.2)
BASOPHILS NFR BLD AUTO: 0.9 % (ref 0–1.5)
BILIRUB SERPL-MCNC: 0.3 MG/DL (ref 0–1.2)
BUN SERPL-MCNC: 38 MG/DL (ref 8–23)
BUN/CREAT SERPL: 38.4 (ref 7–25)
CALCIUM SPEC-SCNC: 9.3 MG/DL (ref 8.6–10.5)
CHLORIDE SERPL-SCNC: 95 MMOL/L (ref 98–107)
CO2 SERPL-SCNC: 29.6 MMOL/L (ref 22–29)
CREAT SERPL-MCNC: 0.99 MG/DL (ref 0.76–1.27)
DEPRECATED RDW RBC AUTO: 47.9 FL (ref 37–54)
EGFRCR SERPLBLD CKD-EPI 2021: 78 ML/MIN/1.73
EOSINOPHIL # BLD AUTO: 0.23 10*3/MM3 (ref 0–0.4)
EOSINOPHIL NFR BLD AUTO: 3.4 % (ref 0.3–6.2)
ERYTHROCYTE [DISTWIDTH] IN BLOOD BY AUTOMATED COUNT: 13.9 % (ref 12.3–15.4)
GLOBULIN UR ELPH-MCNC: 2.6 GM/DL
GLUCOSE SERPL-MCNC: 91 MG/DL (ref 65–99)
HCT VFR BLD AUTO: 35.6 % (ref 37.5–51)
HGB BLD-MCNC: 11.7 G/DL (ref 13–17.7)
IMM GRANULOCYTES # BLD AUTO: 0.15 10*3/MM3 (ref 0–0.05)
IMM GRANULOCYTES NFR BLD AUTO: 2.2 % (ref 0–0.5)
LYMPHOCYTES # BLD AUTO: 1.42 10*3/MM3 (ref 0.7–3.1)
LYMPHOCYTES NFR BLD AUTO: 21.1 % (ref 19.6–45.3)
MCH RBC QN AUTO: 30.7 PG (ref 26.6–33)
MCHC RBC AUTO-ENTMCNC: 32.9 G/DL (ref 31.5–35.7)
MCV RBC AUTO: 93.4 FL (ref 79–97)
MONOCYTES # BLD AUTO: 0.66 10*3/MM3 (ref 0.1–0.9)
MONOCYTES NFR BLD AUTO: 9.8 % (ref 5–12)
NEUTROPHILS NFR BLD AUTO: 4.2 10*3/MM3 (ref 1.7–7)
NEUTROPHILS NFR BLD AUTO: 62.6 % (ref 42.7–76)
NRBC BLD AUTO-RTO: 0 /100 WBC (ref 0–0.2)
PLATELET # BLD AUTO: 217 10*3/MM3 (ref 140–450)
PMV BLD AUTO: 8.5 FL (ref 6–12)
POTASSIUM SERPL-SCNC: 3.9 MMOL/L (ref 3.5–5.2)
PROT SERPL-MCNC: 6.9 G/DL (ref 6–8.5)
RBC # BLD AUTO: 3.81 10*6/MM3 (ref 4.14–5.8)
SODIUM SERPL-SCNC: 136 MMOL/L (ref 136–145)
WBC NRBC COR # BLD AUTO: 6.72 10*3/MM3 (ref 3.4–10.8)

## 2025-05-20 PROCEDURE — 80053 COMPREHEN METABOLIC PANEL: CPT

## 2025-05-20 PROCEDURE — 36415 COLL VENOUS BLD VENIPUNCTURE: CPT

## 2025-05-20 PROCEDURE — 85025 COMPLETE CBC W/AUTO DIFF WBC: CPT

## 2025-05-22 LAB
ALBUMIN SERPL ELPH-MCNC: 3.6 G/DL (ref 2.9–4.4)
ALBUMIN/GLOB SERPL: 1.2 {RATIO} (ref 0.7–1.7)
ALPHA1 GLOB SERPL ELPH-MCNC: 0.2 G/DL (ref 0–0.4)
ALPHA2 GLOB SERPL ELPH-MCNC: 0.7 G/DL (ref 0.4–1)
B-GLOBULIN SERPL ELPH-MCNC: 0.8 G/DL (ref 0.7–1.3)
GAMMA GLOB SERPL ELPH-MCNC: 1.5 G/DL (ref 0.4–1.8)
GLOBULIN SER-MCNC: 3.1 G/DL (ref 2.2–3.9)
IGA SERPL-MCNC: 33 MG/DL (ref 61–437)
IGG SERPL-MCNC: 607 MG/DL (ref 603–1613)
IGM SERPL-MCNC: 1384 MG/DL (ref 15–143)
INTERPRETATION SERPL IEP-IMP: ABNORMAL
KAPPA LC FREE SER-MCNC: 21.6 MG/L (ref 3.3–19.4)
KAPPA LC FREE/LAMBDA FREE SER: 0.37 {RATIO} (ref 0.26–1.65)
LABORATORY COMMENT REPORT: ABNORMAL
LAMBDA LC FREE SERPL-MCNC: 57.8 MG/L (ref 5.7–26.3)
M PROTEIN SERPL ELPH-MCNC: ABNORMAL G/DL
PROT SERPL-MCNC: 6.7 G/DL (ref 6–8.5)

## 2025-05-27 NOTE — PROGRESS NOTES
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 complications, in part, due to her dementia.    His reassessment 2024 again shows a drop in IgM to 1071, M spike with monoclonal IgM kappa 0.4, second IgM kappa 0.5 and kappa/lambda ratio of 0.32.  CBC with normal H&H 12.6 and 37.2, LDH of 205, CMP with ALT 42, AST 42.  The patient's hematologic status has improved and there is at least some consideration of previous Dilantin use (now switched to Keppra) might have had some contribution.  He is doing well hematologically but is continuing to grieve with his wife's death and is finding it very difficult to function.    His repeat testing 2024 to an H&H of 12.5 and 38.0, I have advised that he [place] count 238,000, paraprotein studies with IgM of 1263, kappa/lambda ratio of 0.36, M spike of monoclonal IgM Of 0.7 g/dL and IgM lambda of 0.4 g/d, normal CMP and viscosity 1.7.  He is seen fortunately still coming to terms with his grief but taken very good care of himself physically and gradually forming new relationships.    He is next seen 2025 including a normal CBC-H&H 11.7 and 35.6 white count of 6720 and platelet count of 217,000, CMP with BUN/creatinine of 38 and 0.99, paraproteins with immunoglobulin including IgM of 1384, kappa/lambda ratio of 0.37.    We have discussed his current performance status which appears to be  relatively good except for the development of left sciatica-leg pain.  He had previously required surgery for L5 herniated disc and a follow-up examination-MRI lumbar spine 5/14/2025-revealed multilevel spondylosis and spondylolisthesis particular to the lumbar spine with evidence of moderate to severe spinal stenosis and bilateral foraminal narrowing.  Also nonspecific left anterior paraspinal enhancing mass at L2?  And mildly diffusely heterogeneous marrow signal.  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  A CT of chest, abdomen, pelvis will be obtained as the patient sees his previous neurosurgeon in the next several weeks as the options for his spinal stenosis.    Plan:  *No intervention, as yet, needed hematologically    *Request CT of chest, abdomen, pelvis in the next several weeks    *Patient plans to see his  neurosurgeon approximately 2 weeks    *Follow-up 4 weeks, MD, CBC

## 2025-05-28 ENCOUNTER — OFFICE VISIT (OUTPATIENT)
Dept: ONCOLOGY | Facility: CLINIC | Age: 80
End: 2025-05-28
Payer: MEDICARE

## 2025-05-28 VITALS
BODY MASS INDEX: 24.11 KG/M2 | OXYGEN SATURATION: 99 % | SYSTOLIC BLOOD PRESSURE: 90 MMHG | TEMPERATURE: 98.2 F | WEIGHT: 168.4 LBS | HEIGHT: 70 IN | DIASTOLIC BLOOD PRESSURE: 63 MMHG | HEART RATE: 60 BPM

## 2025-05-28 DIAGNOSIS — D47.2 MGUS (MONOCLONAL GAMMOPATHY OF UNKNOWN SIGNIFICANCE): ICD-10-CM

## 2025-05-28 PROCEDURE — 1126F AMNT PAIN NOTED NONE PRSNT: CPT | Performed by: INTERNAL MEDICINE

## 2025-06-16 ENCOUNTER — HOSPITAL ENCOUNTER (OUTPATIENT)
Facility: HOSPITAL | Age: 80
Discharge: HOME | End: 2025-06-16
Admitting: INTERNAL MEDICINE
Payer: MEDICARE

## 2025-06-16 DIAGNOSIS — D47.2 MGUS (MONOCLONAL GAMMOPATHY OF UNKNOWN SIGNIFICANCE): ICD-10-CM

## 2025-06-16 PROCEDURE — 71260 CT THORAX DX C+: CPT

## 2025-06-16 PROCEDURE — 25510000001 IOPAMIDOL 61 % SOLUTION: Performed by: INTERNAL MEDICINE

## 2025-06-16 PROCEDURE — 74177 CT ABD & PELVIS W/CONTRAST: CPT

## 2025-06-16 PROCEDURE — 25510000002 DIATRIZOATE MEGLUMINE & SODIUM PER 1 ML: Performed by: INTERNAL MEDICINE

## 2025-06-16 RX ORDER — DIATRIZOATE MEGLUMINE AND DIATRIZOATE SODIUM 660; 100 MG/ML; MG/ML
30 SOLUTION ORAL; RECTAL
Status: COMPLETED | OUTPATIENT
Start: 2025-06-16 | End: 2025-06-16

## 2025-06-16 RX ORDER — IOPAMIDOL 612 MG/ML
100 INJECTION, SOLUTION INTRAVASCULAR
Status: COMPLETED | OUTPATIENT
Start: 2025-06-16 | End: 2025-06-16

## 2025-06-16 RX ADMIN — DIATRIZOATE MEGLUMINE AND DIATRIZOATE SODIUM 30 ML: 600; 100 SOLUTION ORAL; RECTAL at 08:35

## 2025-06-16 RX ADMIN — IOPAMIDOL 85 ML: 612 INJECTION, SOLUTION INTRAVENOUS at 09:42

## 2025-06-17 DIAGNOSIS — D47.2 MGUS (MONOCLONAL GAMMOPATHY OF UNKNOWN SIGNIFICANCE): Primary | ICD-10-CM

## 2025-06-17 DIAGNOSIS — R91.8 LUNG NODULES: ICD-10-CM

## 2025-06-19 ENCOUNTER — HOSPITAL ENCOUNTER (OUTPATIENT)
Dept: PET IMAGING | Facility: HOSPITAL | Age: 80
Discharge: HOME | End: 2025-06-19
Payer: MEDICARE

## 2025-06-19 ENCOUNTER — TRANSCRIBE ORDERS (OUTPATIENT)
Dept: ADMINISTRATIVE | Facility: HOSPITAL | Age: 80
End: 2025-06-19
Payer: MEDICARE

## 2025-06-19 ENCOUNTER — APPOINTMENT (OUTPATIENT)
Dept: CT IMAGING | Facility: HOSPITAL | Age: 80
End: 2025-06-19
Payer: MEDICARE

## 2025-06-19 DIAGNOSIS — D47.2 MGUS (MONOCLONAL GAMMOPATHY OF UNKNOWN SIGNIFICANCE): ICD-10-CM

## 2025-06-19 DIAGNOSIS — N13.8 NODULAR PROSTATE WITH URINARY OBSTRUCTION: ICD-10-CM

## 2025-06-19 DIAGNOSIS — N40.3 NODULAR PROSTATE WITH URINARY OBSTRUCTION: ICD-10-CM

## 2025-06-19 DIAGNOSIS — N28.89 RENAL MASS: ICD-10-CM

## 2025-06-19 DIAGNOSIS — N28.89 RENAL MASS: Primary | ICD-10-CM

## 2025-06-19 DIAGNOSIS — R91.8 LUNG NODULES: ICD-10-CM

## 2025-06-19 LAB — GLUCOSE BLDC GLUCOMTR-MCNC: 85 MG/DL (ref 70–130)

## 2025-06-19 PROCEDURE — A9552 F18 FDG: HCPCS | Performed by: INTERNAL MEDICINE

## 2025-06-19 PROCEDURE — 78815 PET IMAGE W/CT SKULL-THIGH: CPT

## 2025-06-19 PROCEDURE — 82948 REAGENT STRIP/BLOOD GLUCOSE: CPT

## 2025-06-19 PROCEDURE — 74178 CT ABD&PLV WO CNTR FLWD CNTR: CPT

## 2025-06-19 PROCEDURE — 34310000005 FLUDEOXYGLUCOSE F18 SOLUTION: Performed by: INTERNAL MEDICINE

## 2025-06-19 PROCEDURE — 25510000001 IOPAMIDOL 61 % SOLUTION: Performed by: UROLOGY

## 2025-06-19 RX ORDER — IOPAMIDOL 612 MG/ML
100 INJECTION, SOLUTION INTRAVASCULAR
Status: COMPLETED | OUTPATIENT
Start: 2025-06-19 | End: 2025-06-19

## 2025-06-19 RX ADMIN — IOPAMIDOL 85 ML: 612 INJECTION, SOLUTION INTRAVENOUS at 13:21

## 2025-06-19 RX ADMIN — FLUDEOXYGLUCOSE F 18 1 DOSE: 200 INJECTION, SOLUTION INTRAVENOUS at 12:15

## 2025-06-24 NOTE — PROGRESS NOTES
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  He is seen with his wife 11/11/2021 again making gradual recovery.  Fortunately in a extensive review of his records there is no suggestion of an accelerated lymphoproliferative disorder.                                                                                                        The patient is next reviewed 11/9/2022 with testing 10/31/2022 demonstrating H&H of 12.0 and 35.3 with white count of 6900 and platelet count of 199,000, CMP with alkaline phos 120, otherwise normal including BUN/creatinine, viscosity normal at 1.6, paraproteins with IgG 613, IgA 38, IgM 91, monoclonal IgM kappa 0.2 g deciliter, monoclonal IgM kappa 0.5 g/dL, kappa/lambda ratio of 0.37.  He is having increasing back pain and is concerned about both his antiseizure therapy as well as his back pain the latter which may require surgery.  He is asking for neurologic referral.    The patient was asked to return for follow-up with studies 6/7/2023 include H&H 12.4 and 36.0, white count 5810 and platelet count of 208,000, normal CMP and paraprotein with IgM of 986, SPEP with immunofixation again demonstrate IgM monoclonal specificity and kappa/lambda ratio of 0.37.  His own neurologic assessment with Dr. Worthington was felt to include peripheral neuropathy and possible radicular pain-12/28/2022.  At present plans are proceeding to try to wean  the patient from Dilantin to Keppra    The patient is next evaluated 2024 and, has indeed, transition from Dilantin to Keppra.  This has helped him immensely in terms of certain symptoms that he was having in his general performance status and, to some degree, his peripheral neuropathy.  Repeat testing 2023 includes an increase to IgM to 1209, M spike IgM kappa 0.3 g/dL and IgM lambda 0.5 g/dL  , Kappa/lambda ratio of 0.35.  Additionally LDH is 228 and serum viscosity is 1.5.  There are no findings that would suggest significant progression at this point.    Unfortunately he and his family are trying to manage his wife's illness with progressive Alzheimer's dementia and this is producing considerable distress to all involved.    The patient is next seen 2024 unfortunately with his wife having recently  from complications, in part, due to her dementia.    His reassessment 2024 again shows a drop in IgM to 1071, M spike with monoclonal IgM kappa 0.4, second IgM kappa 0.5 and kappa/lambda ratio of 0.32.  CBC with normal H&H 12.6 and 37.2, LDH of 205, CMP with ALT 42, AST 42.  The patient's hematologic status has improved and there is at least some consideration of previous Dilantin use (now switched to Keppra) might have had some contribution.  He is doing well hematologically but is continuing to grieve with his wife's death and is finding it very difficult to function.    His repeat testing 2024 to an H&H of 12.5 and 38.0, I have advised that he [place] count 238,000, paraprotein studies with IgM of 1263, kappa/lambda ratio of 0.36, M spike of monoclonal IgM Of 0.7 g/dL and IgM lambda of 0.4 g/d, normal CMP and viscosity 1.7.  He is seen fortunately still coming to terms with his grief but taken very good care of himself physically and gradually forming new relationships.    He is next seen 2025 including a normal CBC-H&H 11.7 and 35.6 white count of 6720 and platelet count of  217,000, CMP with BUN/creatinine of 38 and 0.99, paraproteins with immunoglobulin including IgM of 1384, kappa/lambda ratio of 0.37.    We have discussed his current performance status which appears to be relatively good except for the development of left sciatica-leg pain.  He had previously required surgery for L5 herniated disc and a follow-up examination-MRI lumbar spine 5/14/2025-revealed multilevel spondylosis and spondylolisthesis particular to the lumbar spine with evidence of moderate to severe spinal stenosis and bilateral foraminal narrowing.  Also nonspecific left anterior paraspinal enhancing mass at L2?  And mildly diffusely heterogeneous marrow signal.  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  A CT of chest, abdomen, pelvis will be obtained as the patient sees his previous neurosurgeon in the next several weeks as the options for his spinal stenosis.    The patient underwent 6/16/2025 CT of chest, abdomen, pelvis demonstrating enlarged lymph node retroperitoneal left measuring 2.7 x 1.7 cm, additional enlarged aortocaval lymph node measuring 1.6 cm in right lower retroperitoneum, measuring 1.1 cm, multiple shotty retroperitoneal lymph nodes elsewhere that are stable and multiple shotty mesenteric and portacaval lymph nodes that are stable.  There is further a 5.1 cm hyperdense lesion arising from the lower pole of the right kidney with concern for a solid renal mass or a large hyperdense cyst, additional tiny lesions elsewhere in the right kidney that are too small to characterize.     A follow-up PET/CT 6/19 demonstrated a hypermetabolic lung nodule pleural-based measuring 0.9 x 0.8 at the inferior aspect of the right lower lobe SUV of 2.5, 8 mm left upper lobe nodule with SUV of 1.1, hypermetabolic node along the left internal mammary chain at 3.4 SUV.  In the abdomen pelvis are hypermetabolic left  periodic and paraspinal lymph nodes SUV of 6.6 and 1.6 x 1.2 aortocaval node SUV of 4.4, splenic activity is less than that in the liver and the 4 cm splenic lesion of the same metabolic activity is the rest the spleen.  The hyper metabolic 5 cm mass lower pole of the right kidney was hypermetabolic at 6.4 and there is asymmetric enlargement of the right aspect of the prostate and some vesicles hypermetabolic SUV of 6.2.  There is no suspicious bony activity the right iliac body SUV is 2.5.    CT abdomen pelvis with and without contrast 6/19/2025 demonstrates a dominant right renal mass that is partially exophytic from the lower pole the right kidney, similar enhancing ill-defined mass upper pole of the right kidney measuring 2.1 cm.  Adenopathy again seen in angela hepatis, retroperitoneum and mesentery with one of the largest in the  aortocaval region 1.5 x 1.3 and 1 the largest mesenteric nodes in the left midabdomen is 1.6 x 0.9.    A follow-up PET/CT demonstrated a hypermetabolic lung nodule pleural-based measuring 0.9 x 0.8 at the inferior aspect of the right lower lobe SUV of 2.5, 8 mm left upper lobe nodule with SUV of 1.1, hypermetabolic node along the left internal mammary chain at 3.4 SUV.  In the abdomen pelvis are hypermetabolic left periodic and paraspinal lymph nodes SUV of 6.6 and 1.6 x 1.2 aortocaval node SUV of 4.4, splenic activity is less than that in the liver and the 4 cm splenic lesion of the same metabolic activity is the rest the spleen.  The hyper metabolic 5 cm mass lower pole of the right kidney was hypermetabolic at 6.4 and there is asymmetric enlargement of the right aspect of the prostate and some vesicles hypermetabolic SUV of 6.2.  There is no suspicious bony activity the right iliac body SUV is 2.5.    CT abdomen pelvis with and without contrast 6/19/2025 demonstrates a dominant right renal mass that is partially exophytic from the lower pole the right kidney, similar enhancing  ill-defined mass upper pole of the right kidney measuring 2.1 cm.  Adenopathy again seen in angela hepatis, retroperitoneum and mesentery with one of the largest in the  aortocaval region 1.5 x 1.3 and 1 the largest mesenteric nodes in the left midabdomen is 1.6 x 0.9.    The patient's findings were discussed with urology-Dr. Pace-6/24/2025 who indicated the patient may now require robotic partial nephrectomy.  The patient is seen 6/25/2025 and we have discussed this as a possibility while awaiting urologic consultation/opinion.  If surgery is offered then this would also allow lauro sampling    Plan:  *Additional calls made to Dr. Pace and a formal repeat consultation to be requested    *As we await that referral/review the patient is, fortunately, without additional symptoms including lack of further hematuria at this point.    *3-6 follow-up MD DOMINIQUE spent 50 minutes caring for Ted on this date of service. This time includes time spent by me in the following activities: preparing for the visit, reviewing tests, obtaining and/or reviewing a separately obtained history, performing a medically appropriate examination and/or evaluation, counseling and educating the patient/family/caregiver, ordering medications, tests, or procedures, referring and communicating with other health care professionals, documenting information in the medical record, independently interpreting results and communicating that information with the patient/family/caregiver, and care coordination.

## 2025-06-25 ENCOUNTER — LAB (OUTPATIENT)
Dept: LAB | Facility: HOSPITAL | Age: 80
End: 2025-06-25
Payer: MEDICARE

## 2025-06-25 ENCOUNTER — OFFICE VISIT (OUTPATIENT)
Dept: ONCOLOGY | Facility: CLINIC | Age: 80
End: 2025-06-25
Payer: MEDICARE

## 2025-06-25 VITALS
OXYGEN SATURATION: 97 % | RESPIRATION RATE: 16 BRPM | DIASTOLIC BLOOD PRESSURE: 74 MMHG | WEIGHT: 171.1 LBS | HEIGHT: 70 IN | HEART RATE: 55 BPM | BODY MASS INDEX: 24.5 KG/M2 | SYSTOLIC BLOOD PRESSURE: 131 MMHG

## 2025-06-25 DIAGNOSIS — D47.2 MGUS (MONOCLONAL GAMMOPATHY OF UNKNOWN SIGNIFICANCE): Primary | ICD-10-CM

## 2025-06-25 DIAGNOSIS — N28.89 RIGHT KIDNEY MASS: ICD-10-CM

## 2025-06-25 DIAGNOSIS — D47.2 MGUS (MONOCLONAL GAMMOPATHY OF UNKNOWN SIGNIFICANCE): ICD-10-CM

## 2025-06-25 LAB
BASOPHILS # BLD AUTO: 0.08 10*3/MM3 (ref 0–0.2)
BASOPHILS NFR BLD AUTO: 1.3 % (ref 0–1.5)
DEPRECATED RDW RBC AUTO: 47.8 FL (ref 37–54)
EOSINOPHIL # BLD AUTO: 0.27 10*3/MM3 (ref 0–0.4)
EOSINOPHIL NFR BLD AUTO: 4.3 % (ref 0.3–6.2)
ERYTHROCYTE [DISTWIDTH] IN BLOOD BY AUTOMATED COUNT: 14 % (ref 12.3–15.4)
HCT VFR BLD AUTO: 37 % (ref 37.5–51)
HGB BLD-MCNC: 12.5 G/DL (ref 13–17.7)
IMM GRANULOCYTES # BLD AUTO: 0.07 10*3/MM3 (ref 0–0.05)
IMM GRANULOCYTES NFR BLD AUTO: 1.1 % (ref 0–0.5)
LYMPHOCYTES # BLD AUTO: 1.19 10*3/MM3 (ref 0.7–3.1)
LYMPHOCYTES NFR BLD AUTO: 19 % (ref 19.6–45.3)
MCH RBC QN AUTO: 31.1 PG (ref 26.6–33)
MCHC RBC AUTO-ENTMCNC: 33.8 G/DL (ref 31.5–35.7)
MCV RBC AUTO: 92 FL (ref 79–97)
MONOCYTES # BLD AUTO: 0.72 10*3/MM3 (ref 0.1–0.9)
MONOCYTES NFR BLD AUTO: 11.5 % (ref 5–12)
NEUTROPHILS NFR BLD AUTO: 3.93 10*3/MM3 (ref 1.7–7)
NEUTROPHILS NFR BLD AUTO: 62.8 % (ref 42.7–76)
NRBC BLD AUTO-RTO: 0 /100 WBC (ref 0–0.2)
PLATELET # BLD AUTO: 244 10*3/MM3 (ref 140–450)
PMV BLD AUTO: 9.1 FL (ref 6–12)
RBC # BLD AUTO: 4.02 10*6/MM3 (ref 4.14–5.8)
WBC NRBC COR # BLD AUTO: 6.26 10*3/MM3 (ref 3.4–10.8)

## 2025-06-25 PROCEDURE — 85025 COMPLETE CBC W/AUTO DIFF WBC: CPT

## 2025-06-25 PROCEDURE — 36415 COLL VENOUS BLD VENIPUNCTURE: CPT

## 2025-06-26 ENCOUNTER — TRANSCRIBE ORDERS (OUTPATIENT)
Dept: GENERAL RADIOLOGY | Facility: HOSPITAL | Age: 80
End: 2025-06-26
Payer: MEDICARE

## 2025-06-26 DIAGNOSIS — N28.89 RIGHT RENAL MASS: Primary | ICD-10-CM

## 2025-06-27 ENCOUNTER — HOSPITAL ENCOUNTER (OUTPATIENT)
Facility: HOSPITAL | Age: 80
Discharge: HOME | End: 2025-06-27
Payer: MEDICARE

## 2025-06-27 DIAGNOSIS — N40.3 NODULAR PROSTATE WITH URINARY OBSTRUCTION: ICD-10-CM

## 2025-06-27 DIAGNOSIS — N13.8 NODULAR PROSTATE WITH URINARY OBSTRUCTION: ICD-10-CM

## 2025-06-27 PROCEDURE — 25510000002 GADOBENATE DIMEGLUMINE 529 MG/ML SOLUTION: Performed by: UROLOGY

## 2025-06-27 PROCEDURE — 72197 MRI PELVIS W/O & W/DYE: CPT

## 2025-06-27 PROCEDURE — A9577 INJ MULTIHANCE: HCPCS | Performed by: UROLOGY

## 2025-06-27 RX ADMIN — GADOBENATE DIMEGLUMINE 16 ML: 529 INJECTION, SOLUTION INTRAVENOUS at 13:01

## 2025-07-21 ENCOUNTER — TELEPHONE (OUTPATIENT)
Dept: ONCOLOGY | Facility: CLINIC | Age: 80
End: 2025-07-21
Payer: MEDICARE

## 2025-07-21 NOTE — TELEPHONE ENCOUNTER
Caller: Ted Morales    Relationship: Self    Best call back number:   Telephone Information:   Mobile [phone]     What was the call regarding:  PT CB AFTER SPEAKING WITH [facility] THEY STILL DO NOT HAVE SOME OF THE GENETIC TESTING BACK YET AND PT IS WANTING TO KNOW IF HE NEEDS TO MOVE THIS APPT OR KEEP AS IS. PLEASE CB TO ADVISE.

## 2025-07-21 NOTE — TELEPHONE ENCOUNTER
"Caller: Ted Morales     Relationship:SELF     Best call back number: [phone]    What is your medical concern? PATIENT WENT TO [facility] ON 7/17/25 & THEY HAVE MORE INFO. THAT IS IN \"CARE EVERYWHERE\" THAT PT WOULD LIKE DR. LUI TO REVIEW BEFORE HIS 7/23/25 APPT.     Is your provider already aware of this issue? YES    Have you been treated for this issue? YES      "

## 2025-07-22 NOTE — PROGRESS NOTES
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 initial consultation in December 2017.                The patient is next seen 11/10/2021.  Records indicate that he did suffer a seizure on Dilantin, status post recent back injury and epidural 2 days previous assessed 8/27/2021.  His Dilantin level was found to be 20.2.  Follow-up CT 8/27 was without evidence of fracture or hemorrhage, mild atrophy present.  At this point he was undergoing assessment for lumbar disc herniation being seen by spine specialist.       Follow-up MRI of the lumbar spine 9/14 revealed lateral laminectomy and partial facetectomy on the right L4-L5, enhancing tissue within the neuroforamen epidural space thought to be granulation ablating right L4 exiting nerve and transversing L5 nerve root, mild canal stenosis and lateral recess narrowing similar compared to previous, small disc herniation could not be excluded.  The patient's pain accelerated and eventually required surgery at [facility] in [place] 8/30/2021.  His studies revealed an L4-5 disc herniation with nerve root compression and he proceeded to a right L3-5 microdiscectomy.  He has been slowly recovering.  He has follow-up with neurology considering the use of Keppra as an alternative to Dilantin and he is encouraged to consider this.       His reassessment per paraprotein include 10/28/2021  with IgG 673, IgA 41, IgM 796, M spike with monoclonal IgM kappa 0.3 g/dL, second IgM lambda and 0.5 g/dL and kappa/lambda ratio of 0.38.  CBC with H&H 12.6 and 38.6, white count of 9/3/1939, platelet count of 272,000.  He is seen with his wife 11/11/2021 again making gradual recovery.  Fortunately in a extensive review of his records there is no suggestion of an accelerated lymphoproliferative disorder.                                                                                                        The patient is next reviewed 11/9/2022 with testing 10/31/2022 demonstrating H&H of 12.0 and 35.3 with white count of 6900 and platelet count of 199,000, CMP with alkaline phos 120, otherwise normal including BUN/creatinine, viscosity normal at 1.6, paraproteins with IgG 613, IgA 38, IgM 91, monoclonal IgM kappa 0.2 g deciliter, monoclonal IgM kappa 0.5 g/dL, kappa/lambda ratio of 0.37.  He is having increasing back pain and is concerned about both his antiseizure therapy as well as his back pain the latter which may require surgery.  He is asking for neurologic referral.    The patient was asked to return for follow-up with studies 6/7/2023 include H&H 12.4 and 36.0, white count 5810 and platelet count of 208,000, normal CMP and paraprotein with IgM of 986, SPEP with immunofixation again demonstrate IgM monoclonal specificity and kappa/lambda ratio of 0.37.  His own neurologic assessment with Dr. Worthington was felt to include peripheral neuropathy and possible radicular pain-12/28/2022.  At present plans are proceeding to try to wean the patient from Dilantin to Keppra    The patient is next evaluated 1/2/2024 and, has indeed, transition from Dilantin to Keppra.  This has helped him immensely in terms of certain symptoms that he was having in his general performance status and, to some degree, his peripheral neuropathy.  Repeat testing 12/20/2023 includes an increase to IgM to 1209, M spike IgM kappa 0.3 g/dL and IgM  lambda 0.5 g/dL  , Kappa/lambda ratio of 0.35.  Additionally LDH is 228 and serum viscosity is 1.5.  There are no findings that would suggest significant progression at this point.    Unfortunately he and his family are trying to manage his wife's illness with progressive Alzheimer's dementia and this is producing considerable distress to all involved.    The patient is next seen 2024 unfortunately with his wife having recently  from complications, in part, due to her dementia.    His reassessment 2024 again shows a drop in IgM to 1071, M spike with monoclonal IgM kappa 0.4, second IgM kappa 0.5 and kappa/lambda ratio of 0.32.  CBC with normal H&H 12.6 and 37.2, LDH of 205, CMP with ALT 42, AST 42.  The patient's hematologic status has improved and there is at least some consideration of previous Dilantin use (now switched to Keppra) might have had some contribution.  He is doing well hematologically but is continuing to grieve with his wife's death and is finding it very difficult to function.    His repeat testing 2024 to an H&H of 12.5 and 38.0, I have advised that he [place] count 238,000, paraprotein studies with IgM of 1263, kappa/lambda ratio of 0.36, M spike of monoclonal IgM Of 0.7 g/dL and IgM lambda of 0.4 g/d, normal CMP and viscosity 1.7.  He is seen fortunately still coming to terms with his grief but taken very good care of himself physically and gradually forming new relationships.    He is next seen 2025 including a normal CBC-H&H 11.7 and 35.6 white count of 6720 and platelet count of 217,000, CMP with BUN/creatinine of 38 and 0.99, paraproteins with immunoglobulin including IgM of 1384, kappa/lambda ratio of 0.37.    We have discussed his current performance status which appears to be relatively good except for the development of left sciatica-leg pain.  He had previously required surgery for L5 herniated disc and a follow-up examination-MRI lumbar spine  5/14/2025-revealed multilevel spondylosis and spondylolisthesis particular to the lumbar spine with evidence of moderate to severe spinal stenosis and bilateral foraminal narrowing.  Also nonspecific left anterior paraspinal enhancing mass at L2?  And mildly diffusely heterogeneous marrow signal.  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  A CT of chest, abdomen, pelvis will be obtained as the patient sees his previous neurosurgeon in the next several weeks as the options for his spinal stenosis.    The patient underwent 6/16/2025 CT of chest, abdomen, pelvis demonstrating enlarged lymph node retroperitoneal left measuring 2.7 x 1.7 cm, additional enlarged aortocaval lymph node measuring 1.6 cm in right lower retroperitoneum, measuring 1.1 cm, multiple shotty retroperitoneal lymph nodes elsewhere that are stable and multiple shotty mesenteric and portacaval lymph nodes that are stable.  There is further a 5.1 cm hyperdense lesion arising from the lower pole of the right kidney with concern for a solid renal mass or a large hyperdense cyst, additional tiny lesions elsewhere in the right kidney that are too small to characterize.     A follow-up PET/CT 6/19 demonstrated a hypermetabolic lung nodule pleural-based measuring 0.9 x 0.8 at the inferior aspect of the right lower lobe SUV of 2.5, 8 mm left upper lobe nodule with SUV of 1.1, hypermetabolic node along the left internal mammary chain at 3.4 SUV.  In the abdomen pelvis are hypermetabolic left periodic and paraspinal lymph nodes SUV of 6.6 and 1.6 x 1.2 aortocaval node SUV of 4.4, splenic activity is less than that in the liver and the 4 cm splenic lesion of the same metabolic activity is the rest the spleen.  The hyper metabolic 5 cm mass lower pole of the right kidney was hypermetabolic at 6.4 and there is asymmetric enlargement of the right aspect of the prostate and  some vesicles hypermetabolic SUV of 6.2.  There is no suspicious bony activity the right iliac body SUV is 2.5.    CT abdomen pelvis with and without contrast 6/19/2025 demonstrates a dominant right renal mass that is partially exophytic from the lower pole the right kidney, similar enhancing ill-defined mass upper pole of the right kidney measuring 2.1 cm.  Adenopathy again seen in angela hepatis, retroperitoneum and mesentery with one of the largest in the  aortocaval region 1.5 x 1.3 and 1 the largest mesenteric nodes in the left midabdomen is 1.6 x 0.9.    A follow-up PET/CT demonstrated a hypermetabolic lung nodule pleural-based measuring 0.9 x 0.8 at the inferior aspect of the right lower lobe SUV of 2.5, 8 mm left upper lobe nodule with SUV of 1.1, hypermetabolic node along the left internal mammary chain at 3.4 SUV.  In the abdomen pelvis are hypermetabolic left periodic and paraspinal lymph nodes SUV of 6.6 and 1.6 x 1.2 aortocaval node SUV of 4.4, splenic activity is less than that in the liver and the 4 cm splenic lesion of the same metabolic activity is the rest the spleen.  The hyper metabolic 5 cm mass lower pole of the right kidney was hypermetabolic at 6.4 and there is asymmetric enlargement of the right aspect of the prostate and some vesicles hypermetabolic SUV of 6.2.  There is no suspicious bony activity the right iliac body SUV is 2.5.    CT abdomen pelvis with and without contrast 6/19/2025 demonstrates a dominant right renal mass that is partially exophytic from the lower pole the right kidney, similar enhancing ill-defined mass upper pole of the right kidney measuring 2.1 cm.  Adenopathy again seen in angela hepatis, retroperitoneum and mesentery with one of the largest in the  aortocaval region 1.5 x 1.3 and 1 the largest mesenteric nodes in the left midabdomen is 1.6 x 0.9.    The patient's findings were discussed with urology-Dr. Pace-6/24/2025 who indicated the patient may now require  robotic partial nephrectomy.  The patient is seen 6/25/2025 and we have discussed this as a possibility while awaiting urologic consultation/opinion.  If surgery is offered then this would also allow lauro sampling    Additional discussions were held with radiology with concern that the patient had lymphoma on the basis of radiologic studies.  This led to consideration of biopsy and the patient was seen at [facility] particularly through urology with case assessed at tumor board and the patient undergoing ultrasound-guided biopsy right lower pole renal mass.  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  He had external bladder biopsy also thought to be a lymphoid proliferation.  He was also determining his diagnosis of potential prostate carcinoma with a large PI-RADS 5 lesion in the right prostatic base.  On her prior TURBT bladder mass was found in the right hemitrigone medial to the ureteric orifice and biopsy was found to be a lymphoid perforation.  This was thought to be contiguous mass per his lymphoma particular with normal PSA.    He is seen back in office 7/23/2025  and we have discussed potential treatment options with, considering the findings, thus far would be representative of progression of his IgM MGUS to 2 or more extensive lymphoma-lymphoplasmacytic lymphoma.  This would be best treated with Bendamustine and Rituxan therapy.    Please note at the time of this dictation there is indication on the [facility] website of positive  PCR findings of MYD88.  This supports our process and plans.  These overall plans are discussed with Dr. Pace who agrees  proceeding with chemotherapy initially.      Plan:  *Schedule teaching next week for [employer]-plans to proceed with Rituxan  375 mg meter squared day 1, Bendamustine dose reduced to 70 mg meter squared days 1-2-q. 28 days     *We hope to proceed  without port placement    *Return 3-4 weeks to initiate therapy    I spent 70 minutes caring for Ted on this date of service. This time includes time spent by me in the following activities: preparing for the visit, reviewing tests, obtaining and/or reviewing a separately obtained history, performing a medically appropriate examination and/or evaluation, counseling and educating the patient/family/caregiver, ordering medications, tests, or procedures, referring and communicating with other health care professionals, documenting information in the medical record, independently interpreting results and communicating that information with the patient/family/caregiver, and care coordination.

## 2025-07-22 NOTE — TELEPHONE ENCOUNTER
Called pt and advised him to keep his apt tomorrow with Dr. Yarbrough. He v/u.    The patient has been re-examined and I agree with the above assessment or I updated with my findings.

## 2025-07-23 ENCOUNTER — LAB (OUTPATIENT)
Dept: LAB | Facility: HOSPITAL | Age: 80
End: 2025-07-23
Payer: MEDICARE

## 2025-07-23 ENCOUNTER — OFFICE VISIT (OUTPATIENT)
Dept: ONCOLOGY | Facility: CLINIC | Age: 80
End: 2025-07-23
Payer: MEDICARE

## 2025-07-23 ENCOUNTER — TELEPHONE (OUTPATIENT)
Dept: ONCOLOGY | Facility: CLINIC | Age: 80
End: 2025-07-23

## 2025-07-23 VITALS
DIASTOLIC BLOOD PRESSURE: 66 MMHG | WEIGHT: 170.2 LBS | BODY MASS INDEX: 24.37 KG/M2 | HEART RATE: 64 BPM | SYSTOLIC BLOOD PRESSURE: 119 MMHG | TEMPERATURE: 97.5 F | HEIGHT: 70 IN | OXYGEN SATURATION: 98 %

## 2025-07-23 DIAGNOSIS — C83.00 LYMPHOPLASMACYTIC LYMPHOMA: Primary | ICD-10-CM

## 2025-07-23 DIAGNOSIS — D47.2 MGUS (MONOCLONAL GAMMOPATHY OF UNKNOWN SIGNIFICANCE): ICD-10-CM

## 2025-07-23 LAB
BASOPHILS # BLD AUTO: 0.08 10*3/MM3 (ref 0–0.2)
BASOPHILS NFR BLD AUTO: 1.4 % (ref 0–1.5)
DEPRECATED RDW RBC AUTO: 47.5 FL (ref 37–54)
EOSINOPHIL # BLD AUTO: 0.2 10*3/MM3 (ref 0–0.4)
EOSINOPHIL NFR BLD AUTO: 3.6 % (ref 0.3–6.2)
ERYTHROCYTE [DISTWIDTH] IN BLOOD BY AUTOMATED COUNT: 14.1 % (ref 12.3–15.4)
HCT VFR BLD AUTO: 35.9 % (ref 37.5–51)
HGB BLD-MCNC: 12.1 G/DL (ref 13–17.7)
IMM GRANULOCYTES # BLD AUTO: 0.05 10*3/MM3 (ref 0–0.05)
IMM GRANULOCYTES NFR BLD AUTO: 0.9 % (ref 0–0.5)
LYMPHOCYTES # BLD AUTO: 1.56 10*3/MM3 (ref 0.7–3.1)
LYMPHOCYTES NFR BLD AUTO: 27.8 % (ref 19.6–45.3)
MCH RBC QN AUTO: 30.8 PG (ref 26.6–33)
MCHC RBC AUTO-ENTMCNC: 33.7 G/DL (ref 31.5–35.7)
MCV RBC AUTO: 91.3 FL (ref 79–97)
MONOCYTES # BLD AUTO: 0.75 10*3/MM3 (ref 0.1–0.9)
MONOCYTES NFR BLD AUTO: 13.3 % (ref 5–12)
NEUTROPHILS NFR BLD AUTO: 2.98 10*3/MM3 (ref 1.7–7)
NEUTROPHILS NFR BLD AUTO: 53 % (ref 42.7–76)
NRBC BLD AUTO-RTO: 0 /100 WBC (ref 0–0.2)
PLATELET # BLD AUTO: 258 10*3/MM3 (ref 140–450)
PMV BLD AUTO: 9.7 FL (ref 6–12)
RBC # BLD AUTO: 3.93 10*6/MM3 (ref 4.14–5.8)
WBC NRBC COR # BLD AUTO: 5.62 10*3/MM3 (ref 3.4–10.8)

## 2025-07-23 PROCEDURE — 85025 COMPLETE CBC W/AUTO DIFF WBC: CPT

## 2025-07-23 PROCEDURE — 36415 COLL VENOUS BLD VENIPUNCTURE: CPT

## 2025-07-23 NOTE — TELEPHONE ENCOUNTER
Caller: Ted Morales    Relationship to patient: Self    Best call back number: [phone]    Chief complaint: CANC. - R/S DUE TO CATARACT SURGERY    Type of visit: PORT FLUSH, F/U 1 & INFUSION     Requested date: 8/18/25     If rescheduling, when is the original appointment: 8/11/25     Additional notes:PT HAVING HIS LAST CATARACT SURGERY ON 8/14 - POST-OP APPT. ON 8/15 & HOPING TO R/S WITH US FOR 8/18

## 2025-07-23 NOTE — TELEPHONE ENCOUNTER
Caller: Ted Morales    Relationship: Self    Best call back number: [phone]    Which medication are you concerned about: Treanda & Rituxan     Who prescribed you this medication: DR. LUI     What are your concerns: INQUIRING THE DOSAGE OF EACH

## 2025-07-23 NOTE — TELEPHONE ENCOUNTER
Returned call to pt. Gave dosing of treanda and rituxan. Pt states he is going to need to move his apts out due to cataract surgery. Will send message to scheduling.

## 2025-08-05 ENCOUNTER — LAB (OUTPATIENT)
Dept: LAB | Facility: HOSPITAL | Age: 80
End: 2025-08-05
Payer: MEDICARE

## 2025-08-05 ENCOUNTER — OFFICE VISIT (OUTPATIENT)
Dept: ONCOLOGY | Facility: CLINIC | Age: 80
End: 2025-08-05
Payer: MEDICARE

## 2025-08-05 VITALS
OXYGEN SATURATION: 97 % | RESPIRATION RATE: 18 BRPM | HEIGHT: 70 IN | DIASTOLIC BLOOD PRESSURE: 75 MMHG | WEIGHT: 173.6 LBS | BODY MASS INDEX: 24.85 KG/M2 | HEART RATE: 56 BPM | TEMPERATURE: 97.5 F | SYSTOLIC BLOOD PRESSURE: 150 MMHG

## 2025-08-05 DIAGNOSIS — D47.2 MGUS (MONOCLONAL GAMMOPATHY OF UNKNOWN SIGNIFICANCE): ICD-10-CM

## 2025-08-05 DIAGNOSIS — C83.00 LYMPHOPLASMACYTIC LYMPHOMA: Primary | ICD-10-CM

## 2025-08-05 RX ORDER — LANOLIN ALCOHOL/MO/W.PET/CERES
5000 CREAM (GRAM) TOPICAL DAILY
COMMUNITY

## 2025-08-05 RX ORDER — MULTIVIT WITH MINERALS/LUTEIN
250 TABLET ORAL DAILY
COMMUNITY

## 2025-08-05 RX ORDER — SULFAMETHOXAZOLE AND TRIMETHOPRIM 800; 160 MG/1; MG/1
1 TABLET ORAL 3 TIMES WEEKLY
Qty: 12 TABLET | Refills: 7 | Status: SHIPPED | OUTPATIENT
Start: 2025-08-06

## 2025-08-05 RX ORDER — ONDANSETRON 8 MG/1
8 TABLET, FILM COATED ORAL 3 TIMES DAILY PRN
Qty: 30 TABLET | Refills: 5 | Status: SHIPPED | OUTPATIENT
Start: 2025-08-05

## 2025-08-05 RX ORDER — ACYCLOVIR 400 MG/1
400 TABLET ORAL 2 TIMES DAILY
Qty: 60 TABLET | Refills: 7 | Status: SHIPPED | OUTPATIENT
Start: 2025-08-05

## 2025-08-06 ENCOUNTER — TELEPHONE (OUTPATIENT)
Dept: ONCOLOGY | Facility: CLINIC | Age: 80
End: 2025-08-06
Payer: MEDICARE

## 2025-08-13 ENCOUNTER — TELEPHONE (OUTPATIENT)
Dept: ONCOLOGY | Facility: CLINIC | Age: 80
End: 2025-08-13
Payer: MEDICARE

## 2025-08-13 ENCOUNTER — HOSPITAL ENCOUNTER (OUTPATIENT)
Dept: CT IMAGING | Facility: HOSPITAL | Age: 80
Discharge: HOME | End: 2025-08-13
Payer: MEDICARE

## 2025-08-13 DIAGNOSIS — D47.2 MGUS (MONOCLONAL GAMMOPATHY OF UNKNOWN SIGNIFICANCE): ICD-10-CM

## 2025-08-13 PROCEDURE — 74177 CT ABD & PELVIS W/CONTRAST: CPT

## 2025-08-13 PROCEDURE — 25510000002 DIATRIZOATE MEGLUMINE & SODIUM PER 1 ML

## 2025-08-13 PROCEDURE — 71260 CT THORAX DX C+: CPT

## 2025-08-13 PROCEDURE — 25510000001 IOPAMIDOL 61 % SOLUTION

## 2025-08-13 RX ORDER — DIATRIZOATE MEGLUMINE AND DIATRIZOATE SODIUM 660; 100 MG/ML; MG/ML
30 SOLUTION ORAL; RECTAL ONCE
Status: COMPLETED | OUTPATIENT
Start: 2025-08-13 | End: 2025-08-13

## 2025-08-13 RX ORDER — IOPAMIDOL 612 MG/ML
100 INJECTION, SOLUTION INTRAVASCULAR
Status: COMPLETED | OUTPATIENT
Start: 2025-08-13 | End: 2025-08-13

## 2025-08-13 RX ADMIN — IOPAMIDOL 85 ML: 612 INJECTION, SOLUTION INTRAVENOUS at 10:54

## 2025-08-13 RX ADMIN — DIATRIZOATE MEGLUMINE AND DIATRIZOATE SODIUM 30 ML: 660; 100 LIQUID ORAL; RECTAL at 10:54

## 2025-08-18 ENCOUNTER — LAB (OUTPATIENT)
Dept: LAB | Facility: HOSPITAL | Age: 80
End: 2025-08-18
Payer: MEDICARE

## 2025-08-18 ENCOUNTER — OFFICE VISIT (OUTPATIENT)
Dept: ONCOLOGY | Facility: CLINIC | Age: 80
End: 2025-08-18
Payer: MEDICARE

## 2025-08-18 VITALS
OXYGEN SATURATION: 98 % | BODY MASS INDEX: 24.11 KG/M2 | HEIGHT: 70 IN | TEMPERATURE: 97.5 F | WEIGHT: 168.4 LBS | DIASTOLIC BLOOD PRESSURE: 76 MMHG | RESPIRATION RATE: 16 BRPM | SYSTOLIC BLOOD PRESSURE: 131 MMHG | HEART RATE: 56 BPM

## 2025-08-18 DIAGNOSIS — C83.00 LYMPHOPLASMACYTIC LYMPHOMA: ICD-10-CM

## 2025-08-18 DIAGNOSIS — C83.00 LYMPHOPLASMACYTIC LYMPHOMA: Primary | ICD-10-CM

## 2025-08-18 LAB
ALBUMIN SERPL-MCNC: 4.7 G/DL (ref 3.5–5.2)
ALBUMIN/GLOB SERPL: 1.7 G/DL
ALP SERPL-CCNC: 73 U/L (ref 39–117)
ALT SERPL W P-5'-P-CCNC: 29 U/L (ref 1–41)
ANION GAP SERPL CALCULATED.3IONS-SCNC: 12.9 MMOL/L (ref 5–15)
AST SERPL-CCNC: 27 U/L (ref 1–40)
BASOPHILS # BLD AUTO: 0.08 10*3/MM3 (ref 0–0.2)
BASOPHILS NFR BLD AUTO: 1.5 % (ref 0–1.5)
BILIRUB SERPL-MCNC: 0.4 MG/DL (ref 0–1.2)
BUN SERPL-MCNC: 27 MG/DL (ref 8–23)
BUN/CREAT SERPL: 34.2 (ref 7–25)
CALCIUM SPEC-SCNC: 9.9 MG/DL (ref 8.6–10.5)
CHLORIDE SERPL-SCNC: 102 MMOL/L (ref 98–107)
CO2 SERPL-SCNC: 26.1 MMOL/L (ref 22–29)
CREAT SERPL-MCNC: 0.79 MG/DL (ref 0.76–1.27)
DEPRECATED RDW RBC AUTO: 47.8 FL (ref 37–54)
EGFRCR SERPLBLD CKD-EPI 2021: 90.9 ML/MIN/1.73
EOSINOPHIL # BLD AUTO: 0.25 10*3/MM3 (ref 0–0.4)
EOSINOPHIL NFR BLD AUTO: 4.8 % (ref 0.3–6.2)
ERYTHROCYTE [DISTWIDTH] IN BLOOD BY AUTOMATED COUNT: 14 % (ref 12.3–15.4)
GLOBULIN UR ELPH-MCNC: 2.8 GM/DL
GLUCOSE SERPL-MCNC: 96 MG/DL (ref 65–99)
HCT VFR BLD AUTO: 39.1 % (ref 37.5–51)
HGB BLD-MCNC: 13.1 G/DL (ref 13–17.7)
IMM GRANULOCYTES # BLD AUTO: 0.04 10*3/MM3 (ref 0–0.05)
IMM GRANULOCYTES NFR BLD AUTO: 0.8 % (ref 0–0.5)
LYMPHOCYTES # BLD AUTO: 1.81 10*3/MM3 (ref 0.7–3.1)
LYMPHOCYTES NFR BLD AUTO: 34.7 % (ref 19.6–45.3)
MCH RBC QN AUTO: 31.3 PG (ref 26.6–33)
MCHC RBC AUTO-ENTMCNC: 33.5 G/DL (ref 31.5–35.7)
MCV RBC AUTO: 93.3 FL (ref 79–97)
MONOCYTES # BLD AUTO: 0.68 10*3/MM3 (ref 0.1–0.9)
MONOCYTES NFR BLD AUTO: 13 % (ref 5–12)
NEUTROPHILS NFR BLD AUTO: 2.36 10*3/MM3 (ref 1.7–7)
NEUTROPHILS NFR BLD AUTO: 45.2 % (ref 42.7–76)
NRBC BLD AUTO-RTO: 0 /100 WBC (ref 0–0.2)
PLATELET # BLD AUTO: 266 10*3/MM3 (ref 140–450)
PMV BLD AUTO: 9.4 FL (ref 6–12)
POTASSIUM SERPL-SCNC: 4 MMOL/L (ref 3.5–5.2)
PROT SERPL-MCNC: 7.5 G/DL (ref 6–8.5)
RBC # BLD AUTO: 4.19 10*6/MM3 (ref 4.14–5.8)
SODIUM SERPL-SCNC: 141 MMOL/L (ref 136–145)
WBC NRBC COR # BLD AUTO: 5.22 10*3/MM3 (ref 3.4–10.8)

## 2025-08-18 PROCEDURE — 80053 COMPREHEN METABOLIC PANEL: CPT

## 2025-08-18 PROCEDURE — 85025 COMPLETE CBC W/AUTO DIFF WBC: CPT

## 2025-08-18 PROCEDURE — 36415 COLL VENOUS BLD VENIPUNCTURE: CPT

## 2025-08-18 PROCEDURE — 99213 OFFICE O/P EST LOW 20 MIN: CPT | Performed by: INTERNAL MEDICINE

## 2025-08-18 PROCEDURE — 1126F AMNT PAIN NOTED NONE PRSNT: CPT | Performed by: INTERNAL MEDICINE

## 2025-08-20 LAB
ALBUMIN SERPL ELPH-MCNC: 3.9 G/DL (ref 2.9–4.4)
ALBUMIN/GLOB SERPL: 1.2 {RATIO} (ref 0.7–1.7)
ALPHA1 GLOB SERPL ELPH-MCNC: 0.2 G/DL (ref 0–0.4)
ALPHA2 GLOB SERPL ELPH-MCNC: 0.7 G/DL (ref 0.4–1)
B-GLOBULIN SERPL ELPH-MCNC: 0.9 G/DL (ref 0.7–1.3)
GAMMA GLOB SERPL ELPH-MCNC: 1.7 G/DL (ref 0.4–1.8)
GLOBULIN SER-MCNC: 3.5 G/DL (ref 2.2–3.9)
IGA SERPL-MCNC: 37 MG/DL (ref 61–437)
IGG SERPL-MCNC: 631 MG/DL (ref 603–1613)
IGM SERPL-MCNC: 1630 MG/DL (ref 15–143)
INTERPRETATION SERPL IEP-IMP: ABNORMAL
KAPPA LC FREE SER-MCNC: 30.2 MG/L (ref 3.3–19.4)
KAPPA LC FREE/LAMBDA FREE SER: 0.39 {RATIO} (ref 0.26–1.65)
LABORATORY COMMENT REPORT: ABNORMAL
LAMBDA LC FREE SERPL-MCNC: 78.2 MG/L (ref 5.7–26.3)
M PROTEIN SERPL ELPH-MCNC: ABNORMAL G/DL
PROT SERPL-MCNC: 7.4 G/DL (ref 6–8.5)